# Patient Record
Sex: FEMALE | Race: BLACK OR AFRICAN AMERICAN | NOT HISPANIC OR LATINO | Employment: FULL TIME | URBAN - NONMETROPOLITAN AREA
[De-identification: names, ages, dates, MRNs, and addresses within clinical notes are randomized per-mention and may not be internally consistent; named-entity substitution may affect disease eponyms.]

---

## 2017-04-11 ENCOUNTER — APPOINTMENT (OUTPATIENT)
Dept: LAB | Facility: HOSPITAL | Age: 32
End: 2017-04-11

## 2017-04-11 ENCOUNTER — INITIAL PRENATAL (OUTPATIENT)
Dept: OBSTETRICS AND GYNECOLOGY | Facility: CLINIC | Age: 32
End: 2017-04-11

## 2017-04-11 VITALS
HEIGHT: 68 IN | WEIGHT: 246 LBS | DIASTOLIC BLOOD PRESSURE: 68 MMHG | SYSTOLIC BLOOD PRESSURE: 104 MMHG | BODY MASS INDEX: 37.28 KG/M2

## 2017-04-11 DIAGNOSIS — Z3A.00 WEEKS OF GESTATION OF PREGNANCY NOT SPECIFIED: ICD-10-CM

## 2017-04-11 DIAGNOSIS — Z32.00 PREGNANCY EXAMINATION OR TEST, PREGNANCY UNCONFIRMED: ICD-10-CM

## 2017-04-11 DIAGNOSIS — G40.909 SEIZURE DISORDER IN PREGNANCY, FIRST TRIMESTER (HCC): ICD-10-CM

## 2017-04-11 DIAGNOSIS — Z32.01 PREGNANCY EXAMINATION OR TEST, POSITIVE RESULT: Primary | ICD-10-CM

## 2017-04-11 DIAGNOSIS — O99.351 SEIZURE DISORDER IN PREGNANCY, FIRST TRIMESTER (HCC): ICD-10-CM

## 2017-04-11 PROBLEM — O99.350 SEIZURE DISORDER IN PREGNANCY (HCC): Status: ACTIVE | Noted: 2017-04-11

## 2017-04-11 LAB
ABO GROUP BLD: NORMAL
AMPHET+METHAMPHET UR QL: NEGATIVE
BARBITURATES UR QL SCN: NEGATIVE
BASOPHILS # BLD AUTO: 0.01 10*3/MM3 (ref 0–0.2)
BASOPHILS NFR BLD AUTO: 0.1 % (ref 0–2)
BENZODIAZ UR QL SCN: NEGATIVE
BILIRUB UR QL STRIP: NEGATIVE
BLD GP AB SCN SERPL QL: NEGATIVE
CANNABINOIDS SERPL QL: NEGATIVE
CLARITY UR: CLEAR
COCAINE UR QL: NEGATIVE
COLOR UR: NORMAL
DEPRECATED RDW RBC AUTO: 42.6 FL (ref 36.4–46.3)
EOSINOPHIL # BLD AUTO: 0.04 10*3/MM3 (ref 0–0.7)
EOSINOPHIL NFR BLD AUTO: 0.5 % (ref 0–7)
ERYTHROCYTE [DISTWIDTH] IN BLOOD BY AUTOMATED COUNT: 13.8 % (ref 11.5–14.5)
GLUCOSE UR STRIP-MCNC: NEGATIVE MG/DL
HBV SURFACE AG SERPL QL IA: NEGATIVE
HCT VFR BLD AUTO: 36.9 % (ref 35–45)
HCV AB SER DONR QL: NEGATIVE
HGB BLD-MCNC: 12 G/DL (ref 12–15.5)
HGB UR QL STRIP.AUTO: NEGATIVE
HIV1+2 AB SER QL: NEGATIVE
IMM GRANULOCYTES # BLD: 0.03 10*3/MM3 (ref 0–0.02)
IMM GRANULOCYTES NFR BLD: 0.4 % (ref 0–0.5)
KETONES UR QL STRIP: NEGATIVE
LEUKOCYTE ESTERASE UR QL STRIP.AUTO: NEGATIVE
LYMPHOCYTES # BLD AUTO: 2.04 10*3/MM3 (ref 0.6–4.2)
LYMPHOCYTES NFR BLD AUTO: 23.9 % (ref 10–50)
Lab: NORMAL
MCH RBC QN AUTO: 27.5 PG (ref 26.5–34)
MCHC RBC AUTO-ENTMCNC: 32.5 G/DL (ref 31.4–36)
MCV RBC AUTO: 84.4 FL (ref 80–98)
METHADONE UR QL SCN: NEGATIVE
MONOCYTES # BLD AUTO: 1.02 10*3/MM3 (ref 0–0.9)
MONOCYTES NFR BLD AUTO: 12 % (ref 0–12)
NEUTROPHILS # BLD AUTO: 5.39 10*3/MM3 (ref 2–8.6)
NEUTROPHILS NFR BLD AUTO: 63.1 % (ref 37–80)
NITRITE UR QL STRIP: NEGATIVE
OPIATES UR QL: NEGATIVE
OXYCODONE UR QL SCN: NEGATIVE
PH UR STRIP.AUTO: 6.5 [PH] (ref 5–9)
PLATELET # BLD AUTO: 250 10*3/MM3 (ref 150–450)
PMV BLD AUTO: 9.6 FL (ref 8–12)
PROT UR QL STRIP: NEGATIVE
RBC # BLD AUTO: 4.37 10*6/MM3 (ref 3.77–5.16)
RH BLD: POSITIVE
RUBV IGG SER QL: ABNORMAL
RUBV IGG SER-ACNC: 36.7 IU/ML (ref 0–9.9)
SP GR UR STRIP: 1.01 (ref 1–1.03)
UROBILINOGEN UR QL STRIP: NORMAL
WBC NRBC COR # BLD: 8.53 10*3/MM3 (ref 3.2–9.8)

## 2017-04-11 PROCEDURE — 80307 DRUG TEST PRSMV CHEM ANLYZR: CPT | Performed by: ADVANCED PRACTICE MIDWIFE

## 2017-04-11 PROCEDURE — 86901 BLOOD TYPING SEROLOGIC RH(D): CPT | Performed by: ADVANCED PRACTICE MIDWIFE

## 2017-04-11 PROCEDURE — 36415 COLL VENOUS BLD VENIPUNCTURE: CPT | Performed by: ADVANCED PRACTICE MIDWIFE

## 2017-04-11 PROCEDURE — 86850 RBC ANTIBODY SCREEN: CPT | Performed by: ADVANCED PRACTICE MIDWIFE

## 2017-04-11 PROCEDURE — 85025 COMPLETE CBC W/AUTO DIFF WBC: CPT | Performed by: ADVANCED PRACTICE MIDWIFE

## 2017-04-11 PROCEDURE — 99203 OFFICE O/P NEW LOW 30 MIN: CPT | Performed by: ADVANCED PRACTICE MIDWIFE

## 2017-04-11 PROCEDURE — 87591 N.GONORRHOEAE DNA AMP PROB: CPT | Performed by: ADVANCED PRACTICE MIDWIFE

## 2017-04-11 PROCEDURE — G0432 EIA HIV-1/HIV-2 SCREEN: HCPCS | Performed by: ADVANCED PRACTICE MIDWIFE

## 2017-04-11 PROCEDURE — 87086 URINE CULTURE/COLONY COUNT: CPT | Performed by: ADVANCED PRACTICE MIDWIFE

## 2017-04-11 PROCEDURE — 81003 URINALYSIS AUTO W/O SCOPE: CPT | Performed by: ADVANCED PRACTICE MIDWIFE

## 2017-04-11 PROCEDURE — 86803 HEPATITIS C AB TEST: CPT | Performed by: ADVANCED PRACTICE MIDWIFE

## 2017-04-11 PROCEDURE — 86900 BLOOD TYPING SEROLOGIC ABO: CPT | Performed by: ADVANCED PRACTICE MIDWIFE

## 2017-04-11 PROCEDURE — 87340 HEPATITIS B SURFACE AG IA: CPT | Performed by: ADVANCED PRACTICE MIDWIFE

## 2017-04-11 PROCEDURE — 87491 CHLMYD TRACH DNA AMP PROBE: CPT | Performed by: ADVANCED PRACTICE MIDWIFE

## 2017-04-11 RX ORDER — PRENATAL VIT NO.126/IRON/FOLIC 28MG-0.8MG
TABLET ORAL DAILY
COMMUNITY
End: 2017-05-23

## 2017-04-11 RX ORDER — FOLIC ACID 1 MG/1
TABLET ORAL
Qty: 90 TABLET | Refills: 12 | Status: SHIPPED | OUTPATIENT
Start: 2017-04-11 | End: 2017-05-23

## 2017-04-11 RX ORDER — LEVETIRACETAM 750 MG/1
750 TABLET ORAL 2 TIMES DAILY
Refills: 5 | COMMUNITY
Start: 2017-03-31 | End: 2017-05-23

## 2017-04-11 NOTE — PROGRESS NOTES
S: Patient presents today for her initial OB visit. No significant family or genetic history noted. Patient does have a history of epilepsy, migraines, and back injury. She sees a neurologist. Her last appointment was in March 2017. States she was placed on keppra.  ROS: Reports back pain and cramping. Denies nausea, vomiting, and dysuria.    O: + gestational sac, unable to adequately visualize fetal pole or cardiac activity.  Review prenatal physical tabs.      A:  IUP early gestation, Epilepsy    P: New OB labs, start folic acid 4mg daily given hx of epilepsy, obtain dating US in 2 weeks.  Educated patient on warning signs/symptoms of miscarriage and ectopic pregnancy.    30 minutes out of 40 minutes face-to-face spent counseling patient extensively on dietary changes, exercise, weigh loss, compliance with medications and follow up.    CAMERON Gill    I have reviewed the notes, assessments, and/or procedures performed by Laverne, I concur with her/his documentation of Tamanna Luna.        This document has been electronically signed by Bethanie Garcia CNM on April 11, 2017

## 2017-04-12 LAB
BACTERIA SPEC AEROBE CULT: NORMAL
C TRACH RRNA CVX QL NAA+PROBE: NOT DETECTED
N GONORRHOEA RRNA SPEC QL NAA+PROBE: NOT DETECTED

## 2017-04-14 LAB — RPR SER QL: NORMAL

## 2017-04-26 ENCOUNTER — ROUTINE PRENATAL (OUTPATIENT)
Dept: OBSTETRICS AND GYNECOLOGY | Facility: CLINIC | Age: 32
End: 2017-04-26

## 2017-04-26 VITALS — BODY MASS INDEX: 38.47 KG/M2 | DIASTOLIC BLOOD PRESSURE: 67 MMHG | SYSTOLIC BLOOD PRESSURE: 105 MMHG | WEIGHT: 253 LBS

## 2017-04-26 DIAGNOSIS — G40.909 SEIZURE DISORDER IN PREGNANCY, FIRST TRIMESTER (HCC): Primary | ICD-10-CM

## 2017-04-26 DIAGNOSIS — O99.351 SEIZURE DISORDER IN PREGNANCY, FIRST TRIMESTER (HCC): Primary | ICD-10-CM

## 2017-04-26 PROCEDURE — 99212 OFFICE O/P EST SF 10 MIN: CPT | Performed by: ADVANCED PRACTICE MIDWIFE

## 2017-04-26 NOTE — PROGRESS NOTES
CC: ETHAN visit, history reviewed see history tabs.     ROS:Positive occasional nausea but manages without medication, cramping   Negative leaking fluid from the vagina, swelling in her legs, headache and visual changes      Educated on:drinking H2O, emptying bladder frequently, tylenol OTC, reviewed labs and US findings    Plan: f/u in 4 week/s    Requested records for PAP and patient is to call withy neurologist information in order to request records. Stated that she has a neurologist in [place] and she also goes to an epilepsy center. They are aware of pregnancy

## 2017-05-23 ENCOUNTER — ROUTINE PRENATAL (OUTPATIENT)
Dept: OBSTETRICS AND GYNECOLOGY | Facility: CLINIC | Age: 32
End: 2017-05-23

## 2017-05-23 VITALS — BODY MASS INDEX: 38.32 KG/M2 | DIASTOLIC BLOOD PRESSURE: 74 MMHG | WEIGHT: 252 LBS | SYSTOLIC BLOOD PRESSURE: 101 MMHG

## 2017-05-23 DIAGNOSIS — G40.909 SEIZURE DISORDER IN PREGNANCY, FIRST TRIMESTER (HCC): Primary | ICD-10-CM

## 2017-05-23 DIAGNOSIS — Z3A.12 12 WEEKS GESTATION OF PREGNANCY: ICD-10-CM

## 2017-05-23 DIAGNOSIS — O99.351 SEIZURE DISORDER IN PREGNANCY, FIRST TRIMESTER (HCC): Primary | ICD-10-CM

## 2017-05-23 PROCEDURE — 99212 OFFICE O/P EST SF 10 MIN: CPT | Performed by: ADVANCED PRACTICE MIDWIFE

## 2017-05-23 PROCEDURE — 88142 CYTOPATH C/V THIN LAYER: CPT | Performed by: ADVANCED PRACTICE MIDWIFE

## 2017-05-23 PROCEDURE — 88141 CYTOPATH C/V INTERPRET: CPT | Performed by: PATHOLOGY

## 2017-05-26 LAB
LAB AP CASE REPORT: NORMAL
LAB AP GYN ADDITIONAL INFORMATION: NORMAL
LAB AP GYN OTHER FINDINGS: NORMAL
Lab: NORMAL
PATH INTERP SPEC-IMP: NORMAL
STAT OF ADQ CVX/VAG CYTO-IMP: NORMAL

## 2017-06-21 ENCOUNTER — APPOINTMENT (OUTPATIENT)
Dept: LAB | Facility: HOSPITAL | Age: 32
End: 2017-06-21

## 2017-06-21 ENCOUNTER — ROUTINE PRENATAL (OUTPATIENT)
Dept: OBSTETRICS AND GYNECOLOGY | Facility: CLINIC | Age: 32
End: 2017-06-21

## 2017-06-21 VITALS — WEIGHT: 251 LBS | DIASTOLIC BLOOD PRESSURE: 58 MMHG | BODY MASS INDEX: 38.16 KG/M2 | SYSTOLIC BLOOD PRESSURE: 106 MMHG

## 2017-06-21 DIAGNOSIS — G40.909 SEIZURE DISORDER IN PREGNANCY, SECOND TRIMESTER (HCC): Primary | ICD-10-CM

## 2017-06-21 DIAGNOSIS — O99.352 SEIZURE DISORDER IN PREGNANCY, SECOND TRIMESTER (HCC): Primary | ICD-10-CM

## 2017-06-21 DIAGNOSIS — Z36.89 ENCOUNTER FOR FETAL ANATOMIC SURVEY: ICD-10-CM

## 2017-06-21 DIAGNOSIS — Z3A.17 17 WEEKS GESTATION OF PREGNANCY: ICD-10-CM

## 2017-06-21 PROCEDURE — 82105 ALPHA-FETOPROTEIN SERUM: CPT | Performed by: ADVANCED PRACTICE MIDWIFE

## 2017-06-21 PROCEDURE — 86336 INHIBIN A: CPT | Performed by: ADVANCED PRACTICE MIDWIFE

## 2017-06-21 PROCEDURE — 82677 ASSAY OF ESTRIOL: CPT | Performed by: ADVANCED PRACTICE MIDWIFE

## 2017-06-21 PROCEDURE — 84702 CHORIONIC GONADOTROPIN TEST: CPT | Performed by: ADVANCED PRACTICE MIDWIFE

## 2017-06-21 PROCEDURE — 36415 COLL VENOUS BLD VENIPUNCTURE: CPT | Performed by: ADVANCED PRACTICE MIDWIFE

## 2017-06-21 PROCEDURE — 99212 OFFICE O/P EST SF 10 MIN: CPT | Performed by: ADVANCED PRACTICE MIDWIFE

## 2017-06-21 PROCEDURE — 81220 CFTR GENE COM VARIANTS: CPT | Performed by: ADVANCED PRACTICE MIDWIFE

## 2017-06-21 NOTE — PROGRESS NOTES
"CC: ETHAN visit, history reviewed see history tabs.     ROS:Positive had a seizure last Monday- Saw neurologist and they increased Keppra dose      Negative leaking fluid from the vagina, swelling in her legs, headache, visual changes, low back pain and heartburn    Educated on:Patient counseled on quad screen test is an optional test which helps predict the risk of certain types of birth defects such as (down syndrome and other trisomies), abdominal wall defects and open neural tube defects (spina bifida, anencephaly). Studies show that the Quad screen detects about 60 % of Down syndrome cases and detects more than 80% of tube defects. However is only a screening test. It only predicts risks for problems, but it does not diagnose a definite problem. Abnormal Quad Screen result may be \"false positive\" and further testing may show that there's no evidence of defects in your baby.     Cystic Fibrosis is a genetic disease common in  population. The disease affects the lungs and pancreas, usually resulting in frequent hospitalization and usually premature death. Average survival is 30 years. Both parents must be carriers before a child can have the disease. If both parents are identified as carriers the infant has 25% chance of having CF. To determine if the unborn child has CF more invasive testing must be perform (amniocentesis or chorionic villi sampling). Ordered Quad and Cf screening    A/Plan: f/u in 2 week/s   Hx seizures- saw neurologist and they increased Keppra dose; US- detailed anatomy with MFM consult   Obese  Offered her MD care due to hx seizures but she is ok with seeing midwives.         "

## 2017-06-24 LAB
2ND TRIMESTER 4 SCREEN SERPL-IMP: NORMAL
AFP ADJ MOM SERPL: 0.65
AFP INTERP SERPL-IMP: NORMAL
AFP SERPL-MCNC: 19.8 NG/ML
AGE AT DELIVERY: 30.5 YEARS
FET TS 18 RISK FROM MAT AGE: NORMAL
FET TS 21 RISK FROM MAT AGE: 656
GA METHOD: NORMAL
GA: 17 WEEKS
HCG ADJ MOM SERPL: 1.22
HCG SERPL-ACNC: NORMAL MIU/ML
IDDM PATIENT QL: NO
INHIBIN A ADJ MOM SERPL: 1.01
INHIBIN A SERPL-MCNC: 131.99 PG/ML
LABORATORY COMMENT REPORT: NORMAL
MULTIPLE PREGNANCY: NO
NEURAL TUBE DEFECT RISK FETUS: NORMAL %
RESULT: NORMAL
TS 18 RISK FETUS: NORMAL
TS 21 RISK FETUS: 1334
U ESTRIOL ADJ MOM SERPL: 0.93
U ESTRIOL SERPL-MCNC: 0.87 NG/ML

## 2017-06-27 LAB
CFTR MUT ANL BLD/T: NORMAL
CONV COMMENT: NORMAL

## 2017-07-11 ENCOUNTER — CONSULT (OUTPATIENT)
Dept: OBSTETRICS AND GYNECOLOGY | Facility: CLINIC | Age: 32
End: 2017-07-11

## 2017-07-11 ENCOUNTER — ROUTINE PRENATAL (OUTPATIENT)
Dept: OBSTETRICS AND GYNECOLOGY | Facility: CLINIC | Age: 32
End: 2017-07-11

## 2017-07-11 VITALS — DIASTOLIC BLOOD PRESSURE: 68 MMHG | BODY MASS INDEX: 38.77 KG/M2 | WEIGHT: 255 LBS | SYSTOLIC BLOOD PRESSURE: 114 MMHG

## 2017-07-11 DIAGNOSIS — O99.352 SEIZURE DISORDER IN PREGNANCY, SECOND TRIMESTER (HCC): ICD-10-CM

## 2017-07-11 DIAGNOSIS — R56.9 SEIZURES (HCC): Primary | ICD-10-CM

## 2017-07-11 DIAGNOSIS — G40.909 SEIZURE DISORDER IN PREGNANCY, SECOND TRIMESTER (HCC): Primary | ICD-10-CM

## 2017-07-11 DIAGNOSIS — Q69.9 POLYDACTYLY: ICD-10-CM

## 2017-07-11 DIAGNOSIS — O99.352 SEIZURE DISORDER IN PREGNANCY, SECOND TRIMESTER (HCC): Primary | ICD-10-CM

## 2017-07-11 DIAGNOSIS — G40.909 SEIZURE DISORDER IN PREGNANCY, SECOND TRIMESTER (HCC): ICD-10-CM

## 2017-07-11 DIAGNOSIS — Z3A.19 19 WEEKS GESTATION OF PREGNANCY: Primary | ICD-10-CM

## 2017-07-11 DIAGNOSIS — Z36.9 PRENATAL SCREENING ENCOUNTER: ICD-10-CM

## 2017-07-11 PROCEDURE — 99213 OFFICE O/P EST LOW 20 MIN: CPT | Performed by: NURSE PRACTITIONER

## 2017-07-11 NOTE — PROGRESS NOTES
Thank you for allowing our service in consultation via telemedicine to Ms. Luna given her diagnosis of epilepsy in pregnancy.  As you will recall, she is a 32-year-old black female G1, P0 with a 14-year history of seizures.  She has been treated in the past with Topamax, Dilantin and Depakote without success.  She is currently treated with Keppra 1000 mg b.i.d. and receiving neurologic consultation through Dr. Justin Ramos and ARASELI Renteria.   The optimal treatment of women with epilepsy who are of childbearing age is unclear because of a lack of conclusive data on the comparative teratogenicity of different antiseizure drugs.  Data on the comparative efficacy of various antiseizure drugs for controlling seizures during pregnancy are also quite limited and there are no randomized trials in this setting.  Antiseizure drug therapy should be optimized prior to conception, if possible, before exposure of the fetus to potential teratogenic effects of antiseizure drugs.  Since there is no agreement as to which antiseizure drug is most or least teratogenic, the antiseizure drug that stops seizures in a given patient is the one that should be used.  An exception is valproate.  Results from pregnancy registries and cohort studies suggest a trend toward higher teratogenicity with valproate than with other antiseizure drugs. While concentrations of most antiseizure drugs are generally lower in umbilical compared with maternal serum, valproate is associated with higher umbilical than maternal concentrations, perhaps because of increased placental transfer.  In utero, valproate exposure has also been linked to poorer neurodevelopmental outcomes in childhood, including increased risk for autism spectrum disorders.      Recommendations concerning the use of antiseizure drugs include:    • The antiseizure drug should be administered at the lowest dose and lowest plasma level that protects against tonic-clonic and/or  complex partial seizures.      • The use of multiple agents should be avoided, if possible, especially combinations involving valproate, carbamazepine and phenobarbital.      Low serum folate levels in women with epilepsy are independently associated with an increased risk of major fetal malformations.  It has not yet been conclusively determined if folic acid supplementation prevents neural tube defects in women receiving antiseizure drugs; however, animal studies have shown that valproate and phenytoin decrease the concentration of certain forms of folate and are associated with neural tube defects.  Management during pregnancy consists of folic acid supplementation, screening for major malformations and monitoring plasma antiseizure drug levels.      The patient is currently utilizing Keppra 1000 mg p.o. b.i.d.  Levetiracetam (Keppra) has been assigned to pregnancy category C by the FDA. Animal studies have revealed evidence of increased incidences of minor fetal skeletal abnormalities, retarded offspring growth, embryofetal mortality, and offspring behavioral alterations. Extensive transfer of levetiracetam from mother to fetus has been reported. Physiological changes may gradually decrease the plasma levels of levetiracetam throughout pregnancy, therefore, adjustment in dose may be necessary to avoid seizures. There are no controlled data in human pregnancy. Pregnant patients taking levetiracetam are encouraged to enroll in the North American Antiepileptic Drug (NAAED) Pregnancy Registry. This can be done by calling the toll-free number 1-878.418.5784, but must be done by patients themselves.    I spent a total time of  15 minutes with this patient of which greater than 50% was face to face counseling and coordination of care.  Questions asked by the patient were answered.      Ultrasound Impression:  Size consistent with the previously assigned gestational age.  No lemon or banana sign.  Nuchal fold = 3.9 mm  (normal).  Nasal bone is present.  Normal 4-chamber fetal cardiac view with normal right and left ventricular outflow tracts.  No obvious polydactyly for the hands or feet.  Cervical length = 3.4 cm.    Recommendation:  See consult note.  I noted that as plasma levels of Keppra may decrease throughout pregnancy, adjustment in dose may be necessary to avoid seizures.  Agree with increased folic acid supplementation.  Would suggest repeat ultrasound for growth and assessment for polydactyly given her personal history at 32 weeks' gestation.

## 2017-07-12 NOTE — PROGRESS NOTES
Hx reviewed. Reports that she was admitted to the hospital in [place] on 6/30 because her keppra levels were low and she had another seizure. Her neurologist is in [place]; she was admitted for 5 days and states that her Keppra levels were therapeutic on 7/5 when she was discharged. She is currently taking Keppra 2000mg BID and has an appt with Neuro on 7/30 for follow up.    ROS: Denies n/v, dysuria, spotting, vaginal d/c or odor, cramping or LOF. Reports that she gets frequent headaches, but no more usual than prior to pregnancy. Reports that she will get migraines that cause seizures if she doesn't take tylenol soon enough; occasionally it will get bad enough to go to the ER.     We discussed using Imitrex or another migraine abortive medication in attempt to prevent them from triggering a seizure. She states that she is nervous about taking any other medications on top of the keppra because of the risk of birth defects. Negative screening for OSB, Trisomy 21 and 18. Reviewed results of anatomy scan today. EFW- 341g or 0lb 12oz. BONITA- 13.6cm. FHR- 158. 3 vessel cord with normal placental insertion. Anterior placenta, no previa. All fetal structures noted to appear normal. Recommended follow up at 32 weeks for growth 2/2 seizure disorder and medications.    Encouraged to treat headaches as soon as noticed and continue to keep in close contact with neurologist.

## 2017-08-08 ENCOUNTER — APPOINTMENT (OUTPATIENT)
Dept: LAB | Facility: HOSPITAL | Age: 32
End: 2017-08-08

## 2017-08-08 ENCOUNTER — ROUTINE PRENATAL (OUTPATIENT)
Dept: OBSTETRICS AND GYNECOLOGY | Facility: CLINIC | Age: 32
End: 2017-08-08

## 2017-08-08 VITALS — DIASTOLIC BLOOD PRESSURE: 72 MMHG | SYSTOLIC BLOOD PRESSURE: 112 MMHG | WEIGHT: 249 LBS | BODY MASS INDEX: 37.86 KG/M2

## 2017-08-08 DIAGNOSIS — Z3A.23 23 WEEKS GESTATION OF PREGNANCY: ICD-10-CM

## 2017-08-08 DIAGNOSIS — O99.352 SEIZURE DISORDER IN PREGNANCY, SECOND TRIMESTER (HCC): Primary | ICD-10-CM

## 2017-08-08 DIAGNOSIS — G40.909 SEIZURE DISORDER IN PREGNANCY, SECOND TRIMESTER (HCC): Primary | ICD-10-CM

## 2017-08-08 DIAGNOSIS — N89.8 VAGINAL ITCHING: ICD-10-CM

## 2017-08-08 LAB
CANDIDA ALBICANS: NEGATIVE
DEPRECATED RDW RBC AUTO: 43.6 FL (ref 36.4–46.3)
ERYTHROCYTE [DISTWIDTH] IN BLOOD BY AUTOMATED COUNT: 13.9 % (ref 11.5–14.5)
GARDNERELLA VAGINALIS: NEGATIVE
GLUCOSE 1H P 100 G GLC PO SERPL-MCNC: 95 MG/DL (ref 60–140)
HCT VFR BLD AUTO: 32.1 % (ref 35–45)
HGB BLD-MCNC: 10.5 G/DL (ref 12–15.5)
MCH RBC QN AUTO: 28.1 PG (ref 26.5–34)
MCHC RBC AUTO-ENTMCNC: 32.7 G/DL (ref 31.4–36)
MCV RBC AUTO: 85.8 FL (ref 80–98)
PLATELET # BLD AUTO: 229 10*3/MM3 (ref 150–450)
PMV BLD AUTO: 10.3 FL (ref 8–12)
RBC # BLD AUTO: 3.74 10*6/MM3 (ref 3.77–5.16)
TRICHOMONAS VAGINALIS PCR: NEGATIVE
WBC NRBC COR # BLD: 10.34 10*3/MM3 (ref 3.2–9.8)

## 2017-08-08 PROCEDURE — 36415 COLL VENOUS BLD VENIPUNCTURE: CPT | Performed by: ADVANCED PRACTICE MIDWIFE

## 2017-08-08 PROCEDURE — 87660 TRICHOMONAS VAGIN DIR PROBE: CPT | Performed by: ADVANCED PRACTICE MIDWIFE

## 2017-08-08 PROCEDURE — 85027 COMPLETE CBC AUTOMATED: CPT | Performed by: ADVANCED PRACTICE MIDWIFE

## 2017-08-08 PROCEDURE — 99212 OFFICE O/P EST SF 10 MIN: CPT | Performed by: ADVANCED PRACTICE MIDWIFE

## 2017-08-08 PROCEDURE — 87510 GARDNER VAG DNA DIR PROBE: CPT | Performed by: ADVANCED PRACTICE MIDWIFE

## 2017-08-08 PROCEDURE — 87480 CANDIDA DNA DIR PROBE: CPT | Performed by: ADVANCED PRACTICE MIDWIFE

## 2017-08-08 PROCEDURE — 82950 GLUCOSE TEST: CPT | Performed by: ADVANCED PRACTICE MIDWIFE

## 2017-08-08 NOTE — PROGRESS NOTES
CC: ETHAN visit, history reviewed see history tabs. Was hospitalized in 6/30 through 7/5 for a seizure. Reviewed notes and patient was hospitalized for 5 days- no seizure while in the hospital. Has f/u appt 8/30. Encouraged patient to keep appt. With neurologist and to call and confirm the day. Patient verbalized understanding. Neurologist increased her Keppra dose.    ROS:Positive low back pain and suprapubic pain   Negative leaking fluid from the vagina, swelling in her legs, headache, visual changes and heartburn, no dysuria       Educated on: declined PT, rec. Maternity belt    A/Plan: f/u in 4 week/s   Tamanna was seen today for routine prenatal visit.    Diagnoses and all orders for this visit:    Seizure disorder in pregnancy, second trimester- Was hospitalized in 6/30 through 7/5 for a seizure. Reviewed notes and patient was hospitalized for 5 days- no seizure while in the hospital. Has f/u appt 8/30. Encouraged patient to keep appt. With neurologist and to call and confirm the day. Patient verbalized understanding. Neurologist increased her Keppra dose.    23 weeks gestation of pregnancy-     Glucose, Post 50 Gm Glucola  -     CBC (No Diff)    Vaginal itching  -     Gardnerella vaginalis, Trichomonas vaginalis, Candida albicans, PCR

## 2017-08-31 DIAGNOSIS — O99.210 OBESITY AFFECTING PREGNANCY: Primary | ICD-10-CM

## 2017-09-05 ENCOUNTER — ROUTINE PRENATAL (OUTPATIENT)
Dept: OBSTETRICS AND GYNECOLOGY | Facility: CLINIC | Age: 32
End: 2017-09-05

## 2017-09-05 VITALS — DIASTOLIC BLOOD PRESSURE: 72 MMHG | WEIGHT: 271 LBS | BODY MASS INDEX: 41.21 KG/M2 | SYSTOLIC BLOOD PRESSURE: 104 MMHG

## 2017-09-05 DIAGNOSIS — G40.909 SEIZURE DISORDER IN PREGNANCY, SECOND TRIMESTER (HCC): Primary | ICD-10-CM

## 2017-09-05 DIAGNOSIS — O99.352 SEIZURE DISORDER IN PREGNANCY, SECOND TRIMESTER (HCC): Primary | ICD-10-CM

## 2017-09-05 DIAGNOSIS — Z3A.27 27 WEEKS GESTATION OF PREGNANCY: ICD-10-CM

## 2017-09-05 PROCEDURE — 99212 OFFICE O/P EST SF 10 MIN: CPT | Performed by: ADVANCED PRACTICE MIDWIFE

## 2017-09-05 RX ORDER — VITAMIN A ACETATE, BETA CAROTENE, ASCORBIC ACID, CHOLECALCIFEROL, .ALPHA.-TOCOPHEROL ACETATE, DL-, THIAMINE MONONITRATE, RIBOFLAVIN, NIACINAMIDE, PYRIDOXINE HYDROCHLORIDE, FOLIC ACID, CYANOCOBALAMIN, CALCIUM CARBONATE, FERROUS FUMARATE, ZINC OXIDE, CUPRIC OXIDE 3080; 12; 120; 400; 1; 1.84; 3; 20; 22; 920; 25; 200; 27; 10; 2 [IU]/1; UG/1; MG/1; [IU]/1; MG/1; MG/1; MG/1; MG/1; MG/1; [IU]/1; MG/1; MG/1; MG/1; MG/1; MG/1
1 TABLET, FILM COATED ORAL DAILY
Qty: 30 EACH | Refills: 12 | Status: SHIPPED | OUTPATIENT
Start: 2017-09-05

## 2017-09-05 NOTE — PROGRESS NOTES
CC: ETHAN visit, history reviewed see history tabs. Reports that she had a seizure about a week ago but did not go to the ER. Has appt in December with neurologist    ROS:Positive headache   Negative leaking fluid from the vagina, swelling in her legs, headache, visual changes and low back pain      Educated on:reviewed 1 hour glucose test. Patient gained 20 lbs so far in pregnancy, educated on nutrition. Educated on increasing water and reviewed comfort measures. Verbalized understanding    A/Plan: f/u in 2 week/s   Tamanna was seen today for routine prenatal visit.    Diagnoses and all orders for this visit:    Seizure disorder in pregnancy, second trimester  -     US Ob Follow Up Transabdominal Approach; Future    27 weeks gestation of pregnancy    Other orders  -     prenatal vitamins (PRENATAL 27-1) 27-1 MG tablet tablet; Take 1 tablet by mouth Daily.

## 2017-09-14 ENCOUNTER — HOSPITAL ENCOUNTER (OUTPATIENT)
Facility: HOSPITAL | Age: 32
Discharge: HOME | End: 2017-09-14
Attending: OBSTETRICS & GYNECOLOGY | Admitting: OBSTETRICS & GYNECOLOGY

## 2017-09-14 VITALS
OXYGEN SATURATION: 99 % | TEMPERATURE: 97 F | HEART RATE: 92 BPM | SYSTOLIC BLOOD PRESSURE: 124 MMHG | DIASTOLIC BLOOD PRESSURE: 70 MMHG

## 2017-09-14 LAB
BILIRUB UR QL STRIP: NEGATIVE
CANDIDA ALBICANS: POSITIVE
CLARITY UR: CLEAR
COLOR UR: YELLOW
GARDNERELLA VAGINALIS: NEGATIVE
GLUCOSE UR STRIP-MCNC: NEGATIVE MG/DL
HGB UR QL STRIP.AUTO: NEGATIVE
KETONES UR QL STRIP: ABNORMAL
LEUKOCYTE ESTERASE UR QL STRIP.AUTO: NEGATIVE
NITRITE UR QL STRIP: NEGATIVE
PH UR STRIP.AUTO: 7 [PH] (ref 5–9)
PROT UR QL STRIP: NEGATIVE
SP GR UR STRIP: 1.02 (ref 1–1.03)
TRICHOMONAS VAGINALIS PCR: NEGATIVE
UROBILINOGEN UR QL STRIP: ABNORMAL

## 2017-09-14 PROCEDURE — 87480 CANDIDA DNA DIR PROBE: CPT | Performed by: OBSTETRICS & GYNECOLOGY

## 2017-09-14 PROCEDURE — 87660 TRICHOMONAS VAGIN DIR PROBE: CPT | Performed by: OBSTETRICS & GYNECOLOGY

## 2017-09-14 PROCEDURE — 59025 FETAL NON-STRESS TEST: CPT

## 2017-09-14 PROCEDURE — 81003 URINALYSIS AUTO W/O SCOPE: CPT | Performed by: OBSTETRICS & GYNECOLOGY

## 2017-09-14 PROCEDURE — 87510 GARDNER VAG DNA DIR PROBE: CPT | Performed by: OBSTETRICS & GYNECOLOGY

## 2017-09-14 PROCEDURE — G0463 HOSPITAL OUTPT CLINIC VISIT: HCPCS

## 2017-09-15 NOTE — NON STRESS TEST
Tamanna Luna, a  at 29w1d with an TENA of 2017, by Last Menstrual Period, was seen at [facility] for a nonstress test.    Chief Complaint   Patient presents with   • Decreased Fetal Movement   • Abdominal Pain       Interpretation A  Nonstress Test Interpretation A: Reactive (17 : Lu Moncada RN)  Comments A: verified with ANGIE Pulido RN (17 : Lu Moncada RN)

## 2017-09-15 NOTE — PROGRESS NOTES
Patient presents with lower abdominal discomfort but only when she is on her feet and active.  She is better now that she is resting.  She has irritation between her legs when she gets hot and sweaty.  She c/o itching in this area.  FHR:  Category I  [place]:  No contractions.  Pelvic:  Erythema of the inner thighs bilaterally consistent with Candida  Vaginitis panel: candida    Impression:  1)Candida of the skin and vagina.  Prescription for Diflucan and Nystatin                       2)Round ligament pain.  Precautions discussed.  Comfort measures discussed.

## 2017-09-21 ENCOUNTER — ROUTINE PRENATAL (OUTPATIENT)
Dept: OBSTETRICS AND GYNECOLOGY | Facility: CLINIC | Age: 32
End: 2017-09-21

## 2017-09-21 VITALS — WEIGHT: 279 LBS | DIASTOLIC BLOOD PRESSURE: 69 MMHG | BODY MASS INDEX: 42.42 KG/M2 | SYSTOLIC BLOOD PRESSURE: 100 MMHG

## 2017-09-21 DIAGNOSIS — O99.353 SEIZURE DISORDER IN PREGNANCY, THIRD TRIMESTER (HCC): Primary | ICD-10-CM

## 2017-09-21 DIAGNOSIS — Q69.9 POLYDACTYLY: ICD-10-CM

## 2017-09-21 DIAGNOSIS — G40.909 SEIZURE DISORDER IN PREGNANCY, THIRD TRIMESTER (HCC): Primary | ICD-10-CM

## 2017-09-21 DIAGNOSIS — Z3A.30 30 WEEKS GESTATION OF PREGNANCY: ICD-10-CM

## 2017-09-21 PROCEDURE — 99212 OFFICE O/P EST SF 10 MIN: CPT | Performed by: ADVANCED PRACTICE MIDWIFE

## 2017-09-21 NOTE — PROGRESS NOTES
CC: ETHAN visit, history reviewed see history tabs.     ROS: Negative leaking fluid from the vagina, swelling in her legs, headache, visual changes, low back pain and heartburn      Educated on:NUTRITION, Patient stated that her neurologist is not going to see her until December. We will attempt to contact the neurologist office and make an appointment sooner. Patient is to update her phone number.     A/Plan: f/u in 2 week/s with US   Tamanna was seen today for routine prenatal visit.    Diagnoses and all orders for this visit:    Seizure disorder in pregnancy, third trimester    30 weeks gestation of pregnancy    Polydactyly

## 2017-10-05 ENCOUNTER — ROUTINE PRENATAL (OUTPATIENT)
Dept: OBSTETRICS AND GYNECOLOGY | Facility: CLINIC | Age: 32
End: 2017-10-05

## 2017-10-05 VITALS — WEIGHT: 282 LBS | BODY MASS INDEX: 42.88 KG/M2 | SYSTOLIC BLOOD PRESSURE: 124 MMHG | DIASTOLIC BLOOD PRESSURE: 84 MMHG

## 2017-10-05 DIAGNOSIS — Z3A.32 32 WEEKS GESTATION OF PREGNANCY: ICD-10-CM

## 2017-10-05 DIAGNOSIS — Q69.9 POLYDACTYLY: ICD-10-CM

## 2017-10-05 DIAGNOSIS — G40.909 SEIZURE DISORDER DURING PREGNANCY IN THIRD TRIMESTER (HCC): Primary | ICD-10-CM

## 2017-10-05 DIAGNOSIS — O99.353 SEIZURE DISORDER DURING PREGNANCY IN THIRD TRIMESTER (HCC): Primary | ICD-10-CM

## 2017-10-05 PROCEDURE — 99212 OFFICE O/P EST SF 10 MIN: CPT | Performed by: NURSE PRACTITIONER

## 2017-10-18 ENCOUNTER — HOSPITAL ENCOUNTER (OUTPATIENT)
Facility: HOSPITAL | Age: 32
Discharge: HOME | End: 2017-10-18
Attending: OBSTETRICS & GYNECOLOGY | Admitting: OBSTETRICS & GYNECOLOGY

## 2017-10-18 PROCEDURE — 59025 FETAL NON-STRESS TEST: CPT

## 2017-10-18 PROCEDURE — G0463 HOSPITAL OUTPT CLINIC VISIT: HCPCS

## 2017-10-18 NOTE — NON STRESS TEST
Tamanna Luna, a  at 34w0d with an TENA of 2017, by Last Menstrual Period, was seen at [facility] for a nonstress test.    Chief Complaint   Patient presents with   • Decreased Fetal Movement     pt states she hasnt felt the baby move since 4pm yesterday. pt reports no vaginal bleeding states she has some mucous discharge.        Interpretation A  Nonstress Test Interpretation A: Reactive (10/18/17 0428 : Damaris Kim RN)  Comments A: verified with zoraida HARTLEY (10/18/17 0428 : Damaris Kim, NAEEM)

## 2017-10-18 NOTE — DISCHARGE INSTRUCTIONS
Please return if decreased fetal movement, any bright red vaginal bleeding or leaking of water like your water broke, painful regular contractions every 2-3 minutes apart. Make sure you keep your next appointment and drink plenty of water.

## 2017-10-23 ENCOUNTER — TELEPHONE (OUTPATIENT)
Dept: NUTRITION | Facility: HOSPITAL | Age: 32
End: 2017-10-23

## 2017-10-23 NOTE — PROGRESS NOTES
Adult Outpatient Nutrition  Assessment    Patient Name:  Tamanna Luna  YOB: 1985  MRN: [id number]    Assessment Date:  10/23/2017    Comments:  Pt was referred to RDN for lack of weight gain in pregnancy. Pt said she had gained on her last visit so MD said she didn't need to talk to RDN after all.  Pt is happy she has gained. Insurance doesn't pay for Medical Nutrition Therapy. If needed, RDN is available for discussion about eating healthy/promoting weight gain in pregnancy.                  [unfilled]        Electronically signed by:  Amara Leonard RD  10/23/17 3:44 PM

## 2017-10-26 ENCOUNTER — ROUTINE PRENATAL (OUTPATIENT)
Dept: OBSTETRICS AND GYNECOLOGY | Facility: CLINIC | Age: 32
End: 2017-10-26

## 2017-10-26 VITALS — DIASTOLIC BLOOD PRESSURE: 65 MMHG | WEIGHT: 291 LBS | SYSTOLIC BLOOD PRESSURE: 105 MMHG | BODY MASS INDEX: 44.25 KG/M2

## 2017-10-26 DIAGNOSIS — Z36.85 ANTENATAL SCREENING FOR STREPTOCOCCUS B: ICD-10-CM

## 2017-10-26 DIAGNOSIS — Z3A.35 35 WEEKS GESTATION OF PREGNANCY: ICD-10-CM

## 2017-10-26 DIAGNOSIS — O99.353 SEIZURE DISORDER DURING PREGNANCY IN THIRD TRIMESTER (HCC): Primary | ICD-10-CM

## 2017-10-26 DIAGNOSIS — Z03.79 SUSPECTED MATERNAL CONDITION NOT FOUND: ICD-10-CM

## 2017-10-26 DIAGNOSIS — G40.909 SEIZURE DISORDER DURING PREGNANCY IN THIRD TRIMESTER (HCC): Primary | ICD-10-CM

## 2017-10-26 PROCEDURE — 87653 STREP B DNA AMP PROBE: CPT | Performed by: ADVANCED PRACTICE MIDWIFE

## 2017-10-26 PROCEDURE — 87186 SC STD MICRODIL/AGAR DIL: CPT | Performed by: ADVANCED PRACTICE MIDWIFE

## 2017-10-26 PROCEDURE — 99212 OFFICE O/P EST SF 10 MIN: CPT | Performed by: ADVANCED PRACTICE MIDWIFE

## 2017-10-26 NOTE — PROGRESS NOTES
CC: ETHAN visit, history reviewed see history tabs.     ROS: Negative leaking fluid from the vagina, swelling in her legs, headache, visual changes, low back pain and heartburn      Educated on:the importance of keeping appointments, nutrition- stated that she eats everything because she craves it.    A/Plan: f/u in 2 week/s   Tamanna was seen today for routine prenatal visit.    Diagnoses and all orders for this visit:    Seizure disorder during pregnancy in third trimester     screening for streptococcus B  -     Group B Strep (Molecular) - Swab, Vagina    Suspected maternal condition not found- patient missed last 2 appointments- referred to Dr. Lombardi due to our next US opening not until 5 weeks.     -     US Ob Follow Up Transabdominal Approach; Future    35 weeks gestation of pregnancy

## 2017-10-27 PROBLEM — B95.1 POSITIVE GBS TEST: Status: ACTIVE | Noted: 2017-10-27

## 2017-10-30 LAB
BACTERIA SPEC AEROBE CULT: ABNORMAL
GROUP B STREP, DNA: POSITIVE

## 2017-11-02 ENCOUNTER — DOCUMENTATION (OUTPATIENT)
Dept: OBSTETRICS AND GYNECOLOGY | Facility: CLINIC | Age: 32
End: 2017-11-02

## 2017-11-02 NOTE — PROGRESS NOTES
Reviewed US from Dr. Lombardi's office EFW 6 lbs 3 oz, growth at 53%. He recommended IOL at 39 weeks gestation due to increased risk of IUFD with BMI >40

## 2017-11-09 ENCOUNTER — ROUTINE PRENATAL (OUTPATIENT)
Dept: OBSTETRICS AND GYNECOLOGY | Facility: CLINIC | Age: 32
End: 2017-11-09

## 2017-11-09 VITALS — DIASTOLIC BLOOD PRESSURE: 66 MMHG | SYSTOLIC BLOOD PRESSURE: 113 MMHG | BODY MASS INDEX: 45.61 KG/M2 | WEIGHT: 293 LBS

## 2017-11-09 DIAGNOSIS — G40.909 SEIZURE DISORDER DURING PREGNANCY IN THIRD TRIMESTER (HCC): Primary | ICD-10-CM

## 2017-11-09 DIAGNOSIS — Q69.9 POLYDACTYLY: ICD-10-CM

## 2017-11-09 DIAGNOSIS — O99.353 SEIZURE DISORDER DURING PREGNANCY IN THIRD TRIMESTER (HCC): Primary | ICD-10-CM

## 2017-11-09 DIAGNOSIS — Z3A.37 37 WEEKS GESTATION OF PREGNANCY: ICD-10-CM

## 2017-11-09 DIAGNOSIS — B95.1 POSITIVE GBS TEST: ICD-10-CM

## 2017-11-09 PROCEDURE — 99212 OFFICE O/P EST SF 10 MIN: CPT | Performed by: ADVANCED PRACTICE MIDWIFE

## 2017-11-09 NOTE — PROGRESS NOTES
CC: ETHAN visit, history reviewed see history tabs. Patient requested records just in case she delivers at [facility]    ROS:Positive round ligament pain   Negative leaking fluid from the vagina, swelling in her legs, headache, visual changes, low back pain and heartburn      Educated on:Dr. Lombardi recommended IOL at 39 weeks, discussed cervidil and pitocin IOL and how the process goes, verbalized understanding, GBS + and tx will be antibiotics, comfort measures for ligament pain reviewed    A/Plan: f/u in 1 week/s   Tamanna was seen today for routine prenatal visit.    Diagnoses and all orders for this visit:    Seizure disorder during pregnancy in third trimester    Positive GBS test    Polydactyly    37 weeks gestation of pregnancy

## 2017-11-16 ENCOUNTER — PREP FOR SURGERY (OUTPATIENT)
Dept: OTHER | Facility: HOSPITAL | Age: 32
End: 2017-11-16

## 2017-11-16 ENCOUNTER — ROUTINE PRENATAL (OUTPATIENT)
Dept: OBSTETRICS AND GYNECOLOGY | Facility: CLINIC | Age: 32
End: 2017-11-16

## 2017-11-16 VITALS — SYSTOLIC BLOOD PRESSURE: 106 MMHG | DIASTOLIC BLOOD PRESSURE: 64 MMHG | BODY MASS INDEX: 46.68 KG/M2 | WEIGHT: 293 LBS

## 2017-11-16 DIAGNOSIS — B95.1 POSITIVE GBS TEST: ICD-10-CM

## 2017-11-16 DIAGNOSIS — G40.909 SEIZURE DISORDER DURING PREGNANCY IN THIRD TRIMESTER (HCC): Primary | ICD-10-CM

## 2017-11-16 DIAGNOSIS — G40.909 EPILEPSY AFFECTING PREGNANCY IN THIRD TRIMESTER (HCC): Primary | ICD-10-CM

## 2017-11-16 DIAGNOSIS — Q69.9 POLYDACTYLY: ICD-10-CM

## 2017-11-16 DIAGNOSIS — O99.353 EPILEPSY AFFECTING PREGNANCY IN THIRD TRIMESTER (HCC): Primary | ICD-10-CM

## 2017-11-16 DIAGNOSIS — Z3A.38 38 WEEKS GESTATION OF PREGNANCY: ICD-10-CM

## 2017-11-16 DIAGNOSIS — O99.353 SEIZURE DISORDER DURING PREGNANCY IN THIRD TRIMESTER (HCC): Primary | ICD-10-CM

## 2017-11-16 PROCEDURE — 99212 OFFICE O/P EST SF 10 MIN: CPT | Performed by: ADVANCED PRACTICE MIDWIFE

## 2017-11-16 RX ORDER — SODIUM CHLORIDE 0.9 % (FLUSH) 0.9 %
1-10 SYRINGE (ML) INJECTION AS NEEDED
Status: CANCELLED | OUTPATIENT
Start: 2017-11-16

## 2017-11-16 RX ORDER — LIDOCAINE HYDROCHLORIDE 10 MG/ML
5 INJECTION, SOLUTION INFILTRATION; PERINEURAL AS NEEDED
Status: CANCELLED | OUTPATIENT
Start: 2017-11-16

## 2017-11-16 RX ORDER — CARBOPROST TROMETHAMINE 250 UG/ML
250 INJECTION, SOLUTION INTRAMUSCULAR AS NEEDED
Status: CANCELLED | OUTPATIENT
Start: 2017-11-16

## 2017-11-16 RX ORDER — MISOPROSTOL 200 UG/1
800 TABLET ORAL AS NEEDED
Status: CANCELLED | OUTPATIENT
Start: 2017-11-16

## 2017-11-16 RX ORDER — ACETAMINOPHEN 325 MG/1
650 TABLET ORAL EVERY 4 HOURS PRN
Status: CANCELLED | OUTPATIENT
Start: 2017-11-16

## 2017-11-16 RX ORDER — METHYLERGONOVINE MALEATE 0.2 MG/ML
200 INJECTION INTRAVENOUS ONCE AS NEEDED
Status: CANCELLED | OUTPATIENT
Start: 2017-11-16

## 2017-11-16 RX ORDER — SODIUM CHLORIDE, SODIUM LACTATE, POTASSIUM CHLORIDE, CALCIUM CHLORIDE 600; 310; 30; 20 MG/100ML; MG/100ML; MG/100ML; MG/100ML
125 INJECTION, SOLUTION INTRAVENOUS CONTINUOUS
Status: CANCELLED | OUTPATIENT
Start: 2017-11-16

## 2017-11-16 RX ORDER — MINERAL OIL
OIL (ML) MISCELLANEOUS ONCE
Status: CANCELLED | OUTPATIENT
Start: 2017-11-16 | End: 2017-11-16

## 2017-11-16 RX ORDER — OXYTOCIN/0.9 % SODIUM CHLORIDE 30/500 ML
2-16 PLASTIC BAG, INJECTION (ML) INTRAVENOUS
Status: CANCELLED | OUTPATIENT
Start: 2017-11-28

## 2017-11-16 NOTE — PROGRESS NOTES
CC: ETHAN visit, history reviewed see history tabs.     ROS:   Negative leaking fluid from the vagina, swelling in her legs, headache, visual changes, low back pain and heartburn      Educated on:Patient declined IOL next week, educated on FKC twice daily and the risks, patient verbalized understanding. Agrees to be induced on 11/27. IOL scheduled. Educated on cervidil, pitocin and discussed cook balloon     A/Plan: f/u in 1 week/s   Tamanna was seen today for routine prenatal visit.    Diagnoses and all orders for this visit:    Seizure disorder during pregnancy in third trimester    Polydactyly    Positive GBS test    38 weeks gestation of pregnancy

## 2017-11-18 ENCOUNTER — HOSPITAL ENCOUNTER (OUTPATIENT)
Facility: HOSPITAL | Age: 32
Discharge: HOME | End: 2017-11-18
Attending: OBSTETRICS & GYNECOLOGY | Admitting: OBSTETRICS & GYNECOLOGY

## 2017-11-18 VITALS
TEMPERATURE: 98.1 F | OXYGEN SATURATION: 98 % | HEART RATE: 101 BPM | SYSTOLIC BLOOD PRESSURE: 131 MMHG | DIASTOLIC BLOOD PRESSURE: 65 MMHG

## 2017-11-18 PROCEDURE — G0463 HOSPITAL OUTPT CLINIC VISIT: HCPCS

## 2017-11-18 PROCEDURE — 59025 FETAL NON-STRESS TEST: CPT

## 2017-11-18 PROCEDURE — 59025 FETAL NON-STRESS TEST: CPT | Performed by: ADVANCED PRACTICE MIDWIFE

## 2017-11-18 NOTE — NON STRESS TEST
Tamanna Luna, a  at 38w3d with an TENA of 2017, by Last Menstrual Period, was seen at [facility] for a nonstress test.    Chief Complaint   Patient presents with   • Decreased Fetal Movement     pt states she was doing her fetal kick counts and only got 5 kicks in an hour and called the midwife and the midwife requested she get monitored. pt reports last time she felt baby move was around midnight. No reports of vaginal bleeding or leaking of fluid.        Interpretation A  Nonstress Test Interpretation A: Reactive (17 : Damaris Kim, RN)  Comments A: verified with antoinette HARTLEY (17 : Damaris Kim, NAEEM)

## 2017-11-18 NOTE — DISCHARGE INSTRUCTIONS
Please return if decreased fetal movement , any bright red vaginal bleeding or leaking of water like your water broke, painful regular contractions every 2-3 minutes apart. Make sure you keep your next appointment and drink plenty of water. Keep doing your kick counts.

## 2017-11-20 PROBLEM — O36.8130 DECREASED FETAL MOVEMENTS IN THIRD TRIMESTER: Status: ACTIVE | Noted: 2017-11-20

## 2017-11-21 ENCOUNTER — ROUTINE PRENATAL (OUTPATIENT)
Dept: OBSTETRICS AND GYNECOLOGY | Facility: CLINIC | Age: 32
End: 2017-11-21

## 2017-11-21 VITALS — DIASTOLIC BLOOD PRESSURE: 71 MMHG | WEIGHT: 293 LBS | SYSTOLIC BLOOD PRESSURE: 107 MMHG | BODY MASS INDEX: 46.38 KG/M2

## 2017-11-21 DIAGNOSIS — O99.353 SEIZURE DISORDER DURING PREGNANCY IN THIRD TRIMESTER (HCC): Primary | ICD-10-CM

## 2017-11-21 DIAGNOSIS — Z3A.38 38 WEEKS GESTATION OF PREGNANCY: ICD-10-CM

## 2017-11-21 DIAGNOSIS — G40.909 SEIZURE DISORDER DURING PREGNANCY IN THIRD TRIMESTER (HCC): Primary | ICD-10-CM

## 2017-11-21 DIAGNOSIS — B95.1 POSITIVE GBS TEST: ICD-10-CM

## 2017-11-21 PROCEDURE — 99212 OFFICE O/P EST SF 10 MIN: CPT | Performed by: ADVANCED PRACTICE MIDWIFE

## 2017-11-21 NOTE — PROGRESS NOTES
CC: ETHAN visit, history reviewed see history tabs.     ROS: Negative leaking fluid from the vagina, swelling in her legs, headache, visual changes, low back pain and heartburn    Objective: traced pedal edema bilaterally  Educated on:FKC, labor s/s    A/Plan: f/u 11/27/17 for IOL  Tamanna was seen today for routine prenatal visit.    Diagnoses and all orders for this visit:    Seizure disorder during pregnancy in third trimester    Positive GBS test    38 weeks gestation of pregnancy

## 2017-11-24 ENCOUNTER — HOSPITAL ENCOUNTER (OUTPATIENT)
Facility: HOSPITAL | Age: 32
Discharge: HOME | End: 2017-11-24
Attending: OBSTETRICS & GYNECOLOGY | Admitting: OBSTETRICS & GYNECOLOGY

## 2017-11-24 VITALS
DIASTOLIC BLOOD PRESSURE: 77 MMHG | TEMPERATURE: 97.9 F | RESPIRATION RATE: 18 BRPM | OXYGEN SATURATION: 100 % | HEART RATE: 86 BPM | SYSTOLIC BLOOD PRESSURE: 126 MMHG

## 2017-11-27 ENCOUNTER — HOSPITAL ENCOUNTER (OUTPATIENT)
Dept: LABOR AND DELIVERY | Facility: HOSPITAL | Age: 32
Discharge: HOME | End: 2017-11-27

## 2017-11-27 ENCOUNTER — HOSPITAL ENCOUNTER (INPATIENT)
Facility: HOSPITAL | Age: 32
LOS: 3 days | Discharge: HOME | End: 2017-11-30
Attending: OBSTETRICS & GYNECOLOGY | Admitting: OBSTETRICS & GYNECOLOGY

## 2017-11-27 DIAGNOSIS — G40.909 EPILEPSY AFFECTING PREGNANCY IN THIRD TRIMESTER (HCC): ICD-10-CM

## 2017-11-27 DIAGNOSIS — O99.353 SEIZURE DISORDER DURING PREGNANCY IN THIRD TRIMESTER (HCC): ICD-10-CM

## 2017-11-27 DIAGNOSIS — G40.909 SEIZURE DISORDER DURING PREGNANCY IN THIRD TRIMESTER (HCC): ICD-10-CM

## 2017-11-27 DIAGNOSIS — Z87.898 HISTORY OF SEIZURES: Primary | ICD-10-CM

## 2017-11-27 DIAGNOSIS — O99.353 EPILEPSY AFFECTING PREGNANCY IN THIRD TRIMESTER (HCC): ICD-10-CM

## 2017-11-27 LAB
ABO GROUP BLD: NORMAL
AMPHET+METHAMPHET UR QL: NEGATIVE
BARBITURATES UR QL SCN: NEGATIVE
BENZODIAZ UR QL SCN: NEGATIVE
BLD GP AB SCN SERPL QL: NEGATIVE
CANNABINOIDS SERPL QL: NEGATIVE
COCAINE UR QL: NEGATIVE
DEPRECATED RDW RBC AUTO: 45.6 FL (ref 36.4–46.3)
ERYTHROCYTE [DISTWIDTH] IN BLOOD BY AUTOMATED COUNT: 14.1 % (ref 11.5–14.5)
HCT VFR BLD AUTO: 35.7 % (ref 35–45)
HGB BLD-MCNC: 11.6 G/DL (ref 12–15.5)
Lab: NORMAL
MCH RBC QN AUTO: 28.6 PG (ref 26.5–34)
MCHC RBC AUTO-ENTMCNC: 32.5 G/DL (ref 31.4–36)
MCV RBC AUTO: 87.9 FL (ref 80–98)
METHADONE UR QL SCN: NEGATIVE
OPIATES UR QL: NEGATIVE
OXYCODONE UR QL SCN: NEGATIVE
PLATELET # BLD AUTO: 227 10*3/MM3 (ref 150–450)
PMV BLD AUTO: 10.5 FL (ref 8–12)
RBC # BLD AUTO: 4.06 10*6/MM3 (ref 3.77–5.16)
RH BLD: POSITIVE
WBC NRBC COR # BLD: 8.46 10*3/MM3 (ref 3.2–9.8)

## 2017-11-27 PROCEDURE — 80307 DRUG TEST PRSMV CHEM ANLYZR: CPT | Performed by: ADVANCED PRACTICE MIDWIFE

## 2017-11-27 PROCEDURE — 25010000002 PROMETHAZINE PER 50 MG: Performed by: OBSTETRICS & GYNECOLOGY

## 2017-11-27 PROCEDURE — 63710000001 DIPHENHYDRAMINE PER 50 MG

## 2017-11-27 PROCEDURE — 86850 RBC ANTIBODY SCREEN: CPT | Performed by: ADVANCED PRACTICE MIDWIFE

## 2017-11-27 PROCEDURE — 86900 BLOOD TYPING SEROLOGIC ABO: CPT | Performed by: ADVANCED PRACTICE MIDWIFE

## 2017-11-27 PROCEDURE — 86901 BLOOD TYPING SEROLOGIC RH(D): CPT | Performed by: ADVANCED PRACTICE MIDWIFE

## 2017-11-27 PROCEDURE — 25010000002 BUTORPHANOL PER 1 MG: Performed by: OBSTETRICS & GYNECOLOGY

## 2017-11-27 PROCEDURE — 85027 COMPLETE CBC AUTOMATED: CPT | Performed by: ADVANCED PRACTICE MIDWIFE

## 2017-11-27 RX ORDER — LEVETIRACETAM 500 MG/1
1000 TABLET ORAL 2 TIMES DAILY
Status: DISCONTINUED | OUTPATIENT
Start: 2017-11-28 | End: 2017-11-27

## 2017-11-27 RX ORDER — SODIUM CHLORIDE, SODIUM LACTATE, POTASSIUM CHLORIDE, CALCIUM CHLORIDE 600; 310; 30; 20 MG/100ML; MG/100ML; MG/100ML; MG/100ML
INJECTION, SOLUTION INTRAVENOUS
Status: DISPENSED
Start: 2017-11-27 | End: 2017-11-28

## 2017-11-27 RX ORDER — DIPHENHYDRAMINE HCL 25 MG
CAPSULE ORAL
Status: COMPLETED
Start: 2017-11-27 | End: 2017-11-27

## 2017-11-27 RX ORDER — PROMETHAZINE HYDROCHLORIDE 25 MG/ML
12.5 INJECTION, SOLUTION INTRAMUSCULAR; INTRAVENOUS ONCE
Status: COMPLETED | OUTPATIENT
Start: 2017-11-27 | End: 2017-11-27

## 2017-11-27 RX ORDER — LIDOCAINE HYDROCHLORIDE 10 MG/ML
5 INJECTION, SOLUTION INFILTRATION; PERINEURAL AS NEEDED
Status: DISCONTINUED | OUTPATIENT
Start: 2017-11-27 | End: 2017-11-28 | Stop reason: HOSPADM

## 2017-11-27 RX ORDER — SODIUM CHLORIDE 0.9 % (FLUSH) 0.9 %
1-10 SYRINGE (ML) INJECTION AS NEEDED
Status: DISCONTINUED | OUTPATIENT
Start: 2017-11-27 | End: 2017-11-28 | Stop reason: HOSPADM

## 2017-11-27 RX ORDER — SODIUM CHLORIDE, SODIUM LACTATE, POTASSIUM CHLORIDE, CALCIUM CHLORIDE 600; 310; 30; 20 MG/100ML; MG/100ML; MG/100ML; MG/100ML
INJECTION, SOLUTION INTRAVENOUS
Status: COMPLETED
Start: 2017-11-27 | End: 2017-11-27

## 2017-11-27 RX ORDER — ACETAMINOPHEN 325 MG/1
650 TABLET ORAL EVERY 4 HOURS PRN
Status: DISCONTINUED | OUTPATIENT
Start: 2017-11-27 | End: 2017-11-28 | Stop reason: HOSPADM

## 2017-11-27 RX ORDER — SODIUM CHLORIDE, SODIUM LACTATE, POTASSIUM CHLORIDE, CALCIUM CHLORIDE 600; 310; 30; 20 MG/100ML; MG/100ML; MG/100ML; MG/100ML
125 INJECTION, SOLUTION INTRAVENOUS CONTINUOUS
Status: DISCONTINUED | OUTPATIENT
Start: 2017-11-27 | End: 2017-11-28 | Stop reason: HOSPADM

## 2017-11-27 RX ORDER — DIPHENHYDRAMINE HCL 25 MG
25 CAPSULE ORAL NIGHTLY PRN
Status: DISCONTINUED | OUTPATIENT
Start: 2017-11-27 | End: 2017-11-28 | Stop reason: HOSPADM

## 2017-11-27 RX ORDER — LEVETIRACETAM 500 MG/1
1000 TABLET ORAL 2 TIMES DAILY
Status: DISCONTINUED | OUTPATIENT
Start: 2017-11-27 | End: 2017-11-28 | Stop reason: HOSPADM

## 2017-11-27 RX ORDER — MINERAL OIL
OIL (ML) MISCELLANEOUS ONCE
Status: DISCONTINUED | OUTPATIENT
Start: 2017-11-27 | End: 2017-11-28 | Stop reason: HOSPADM

## 2017-11-27 RX ORDER — PROMETHAZINE HYDROCHLORIDE 25 MG/ML
INJECTION, SOLUTION INTRAMUSCULAR; INTRAVENOUS
Status: DISPENSED
Start: 2017-11-27 | End: 2017-11-28

## 2017-11-27 RX ORDER — LEVETIRACETAM 500 MG/1
2000 TABLET ORAL 2 TIMES DAILY
Status: DISCONTINUED | OUTPATIENT
Start: 2017-11-27 | End: 2017-11-27

## 2017-11-27 RX ORDER — OXYTOCIN/0.9 % SODIUM CHLORIDE 30/500 ML
2-16 PLASTIC BAG, INJECTION (ML) INTRAVENOUS
Status: DISCONTINUED | OUTPATIENT
Start: 2017-11-28 | End: 2017-11-28 | Stop reason: HOSPADM

## 2017-11-27 RX ORDER — BUTORPHANOL TARTRATE 1 MG/ML
INJECTION, SOLUTION INTRAMUSCULAR; INTRAVENOUS
Status: DISPENSED
Start: 2017-11-27 | End: 2017-11-28

## 2017-11-27 RX ORDER — BUTORPHANOL TARTRATE 1 MG/ML
1 INJECTION, SOLUTION INTRAMUSCULAR; INTRAVENOUS ONCE
Status: COMPLETED | OUTPATIENT
Start: 2017-11-27 | End: 2017-11-27

## 2017-11-27 RX ADMIN — Medication 25 MG: at 21:46

## 2017-11-27 RX ADMIN — SODIUM CHLORIDE, POTASSIUM CHLORIDE, SODIUM LACTATE AND CALCIUM CHLORIDE 125 ML/HR: 600; 310; 30; 20 INJECTION, SOLUTION INTRAVENOUS at 15:32

## 2017-11-27 RX ADMIN — LEVETIRACETAM 2000 MG: 500 TABLET, FILM COATED ORAL at 21:23

## 2017-11-27 RX ADMIN — SODIUM CHLORIDE, POTASSIUM CHLORIDE, SODIUM LACTATE AND CALCIUM CHLORIDE 125 ML/HR: 600; 310; 30; 20 INJECTION, SOLUTION INTRAVENOUS at 15:33

## 2017-11-27 RX ADMIN — BUTORPHANOL TARTRATE 1 MG: 1 INJECTION, SOLUTION INTRAMUSCULAR; INTRAVENOUS at 23:35

## 2017-11-27 RX ADMIN — SODIUM CHLORIDE, POTASSIUM CHLORIDE, SODIUM LACTATE AND CALCIUM CHLORIDE 125 ML/HR: 600; 310; 30; 20 INJECTION, SOLUTION INTRAVENOUS at 20:19

## 2017-11-27 RX ADMIN — LEVETIRACETAM 1000 MG: 500 TABLET, FILM COATED ORAL at 21:28

## 2017-11-27 RX ADMIN — DIPHENHYDRAMINE HYDROCHLORIDE 25 MG: 25 CAPSULE ORAL at 21:46

## 2017-11-27 RX ADMIN — PROMETHAZINE HYDROCHLORIDE 12.5 MG: 25 INJECTION INTRAMUSCULAR; INTRAVENOUS at 23:34

## 2017-11-27 RX ADMIN — DINOPROSTONE 10 MG: 10 INSERT VAGINAL at 17:07

## 2017-11-27 RX ADMIN — VANCOMYCIN HYDROCHLORIDE 1 G: 1 INJECTION, POWDER, LYOPHILIZED, FOR SOLUTION INTRAVENOUS at 21:47

## 2017-11-28 ENCOUNTER — ANESTHESIA (OUTPATIENT)
Dept: LABOR AND DELIVERY | Facility: HOSPITAL | Age: 32
End: 2017-11-28

## 2017-11-28 ENCOUNTER — ANESTHESIA EVENT (OUTPATIENT)
Dept: LABOR AND DELIVERY | Facility: HOSPITAL | Age: 32
End: 2017-11-28

## 2017-11-28 PROBLEM — Z87.898 HISTORY OF SEIZURES: Status: ACTIVE | Noted: 2017-11-28

## 2017-11-28 LAB
ARTERIAL PATENCY WRIST A: ABNORMAL
ATMOSPHERIC PRESS: ABNORMAL MMHG
ATMOSPHERIC PRESS: ABNORMAL MMHG
BASE EXCESS BLDA CALC-SCNC: -6.5 MMOL/L (ref -2.4–2.4)
BASE EXCESS BLDV CALC-SCNC: -5.8 MMOL/L (ref -2.4–2.4)
BDY SITE: ABNORMAL
BDY SITE: ABNORMAL
CA-I BLD-MCNC: 5.8 MG/DL (ref 4.5–4.9)
CA-I BLD-MCNC: 5.8 MG/DL (ref 4.5–4.9)
CO2 BLDA-SCNC: 27.5 MMOL/L (ref 23–27)
CO2 BLDA-SCNC: 27.8 MMOL/L (ref 23–27)
GLUCOSE BLDA-MCNC: 82 MMOL/L
HCO3 BLDA-SCNC: 25.2 MMOL/L (ref 22–26)
HCO3 BLDV-SCNC: 25.1 MMOL/L
HCT VFR BLD CALC: 42 % (ref 38–47)
HGB BLDA-MCNC: 14.3 G/DL (ref 12–16)
HGB BLDA-MCNC: 14.5 G/DL (ref 12–16)
MODALITY: ABNORMAL
MODALITY: ABNORMAL
PCO2 BLDA: 83.4 MM HG (ref 35–45)
PCO2 BLDV: 76.7 MM HG
PH BLDA: 7.1 PH UNITS (ref 7.35–7.45)
PH BLDV: 7.13 PH UNITS (ref 7.31–7.42)
PO2 BLDA: 16.5 MM HG (ref 80–105)
PO2 BLDV: 15.3 MM HG
POTASSIUM BLDA-SCNC: 4.8 MMOL/L (ref 3.6–4.9)
POTASSIUM BLDV-SCNC: 4.7 MMOL/L (ref 3.5–4.9)
SAO2 % BLDCOA: 17.2 % (ref 94–100)
SAO2 % BLDCOV: 19 %
SODIUM BLDA-SCNC: 133.9 MMOL/L (ref 138–146)
SODIUM BLDV-SCNC: 134.8 MMOL/L (ref 120–160)

## 2017-11-28 PROCEDURE — C1755 CATHETER, INTRASPINAL: HCPCS

## 2017-11-28 PROCEDURE — 3E033VJ INTRODUCTION OF OTHER HORMONE INTO PERIPHERAL VEIN, PERCUTANEOUS APPROACH: ICD-10-PCS | Performed by: OBSTETRICS & GYNECOLOGY

## 2017-11-28 PROCEDURE — 88307 TISSUE EXAM BY PATHOLOGIST: CPT | Performed by: OBSTETRICS & GYNECOLOGY

## 2017-11-28 PROCEDURE — 90471 IMMUNIZATION ADMIN: CPT | Performed by: OBSTETRICS & GYNECOLOGY

## 2017-11-28 PROCEDURE — 90682 RIV4 VACC RECOMBINANT DNA IM: CPT | Performed by: OBSTETRICS & GYNECOLOGY

## 2017-11-28 PROCEDURE — G0008 ADMIN INFLUENZA VIRUS VAC: HCPCS | Performed by: OBSTETRICS & GYNECOLOGY

## 2017-11-28 PROCEDURE — 25010000002 PROMETHAZINE PER 50 MG: Performed by: OBSTETRICS & GYNECOLOGY

## 2017-11-28 PROCEDURE — 25010000002 MIDAZOLAM PER 1 MG: Performed by: NURSE ANESTHETIST, CERTIFIED REGISTERED

## 2017-11-28 PROCEDURE — 25010000002 GENTAMICIN PER 80 MG: Performed by: OBSTETRICS & GYNECOLOGY

## 2017-11-28 PROCEDURE — 88307 TISSUE EXAM BY PATHOLOGIST: CPT | Performed by: PATHOLOGY

## 2017-11-28 PROCEDURE — 94760 N-INVAS EAR/PLS OXIMETRY 1: CPT

## 2017-11-28 PROCEDURE — 25010000002 ONDANSETRON PER 1 MG: Performed by: NURSE ANESTHETIST, CERTIFIED REGISTERED

## 2017-11-28 PROCEDURE — 25010000002 BUTORPHANOL PER 1 MG

## 2017-11-28 PROCEDURE — 90715 TDAP VACCINE 7 YRS/> IM: CPT | Performed by: OBSTETRICS & GYNECOLOGY

## 2017-11-28 PROCEDURE — 25010000002 TDAP 5-2.5-18.5 LF-MCG/0.5 SUSPENSION: Performed by: OBSTETRICS & GYNECOLOGY

## 2017-11-28 PROCEDURE — 94799 UNLISTED PULMONARY SVC/PX: CPT

## 2017-11-28 PROCEDURE — 25010000002 PHENYLEPHRINE PER 1 ML: Performed by: NURSE ANESTHETIST, CERTIFIED REGISTERED

## 2017-11-28 PROCEDURE — 25010000002 INFLUENZA VAC SPLIT QUAD SUSPENSION: Performed by: OBSTETRICS & GYNECOLOGY

## 2017-11-28 PROCEDURE — 82803 BLOOD GASES ANY COMBINATION: CPT | Performed by: OBSTETRICS & GYNECOLOGY

## 2017-11-28 PROCEDURE — 3E0P7VZ INTRODUCTION OF HORMONE INTO FEMALE REPRODUCTIVE, VIA NATURAL OR ARTIFICIAL OPENING: ICD-10-PCS | Performed by: OBSTETRICS & GYNECOLOGY

## 2017-11-28 PROCEDURE — 25010000002 HYDROMORPHONE HCL-NACL 6-0.9 MG/30ML-% SOLUTION PREFILLED SYRINGE: Performed by: OBSTETRICS & GYNECOLOGY

## 2017-11-28 PROCEDURE — 51703 INSERT BLADDER CATH COMPLEX: CPT

## 2017-11-28 PROCEDURE — 59514 CESAREAN DELIVERY ONLY: CPT | Performed by: OBSTETRICS & GYNECOLOGY

## 2017-11-28 PROCEDURE — 25010000002 ONDANSETRON PER 1 MG: Performed by: OBSTETRICS & GYNECOLOGY

## 2017-11-28 RX ORDER — OXYCODONE AND ACETAMINOPHEN 10; 325 MG/1; MG/1
1 TABLET ORAL EVERY 4 HOURS PRN
Status: DISCONTINUED | OUTPATIENT
Start: 2017-11-28 | End: 2017-11-30 | Stop reason: HOSPADM

## 2017-11-28 RX ORDER — DIPHENHYDRAMINE HCL 25 MG
25 CAPSULE ORAL NIGHTLY PRN
Status: DISCONTINUED | OUTPATIENT
Start: 2017-11-28 | End: 2017-11-28 | Stop reason: HOSPADM

## 2017-11-28 RX ORDER — MIDAZOLAM HYDROCHLORIDE 1 MG/ML
INJECTION INTRAMUSCULAR; INTRAVENOUS AS NEEDED
Status: DISCONTINUED | OUTPATIENT
Start: 2017-11-28 | End: 2017-11-28 | Stop reason: SURG

## 2017-11-28 RX ORDER — SODIUM CHLORIDE, SODIUM LACTATE, POTASSIUM CHLORIDE, CALCIUM CHLORIDE 600; 310; 30; 20 MG/100ML; MG/100ML; MG/100ML; MG/100ML
INJECTION, SOLUTION INTRAVENOUS
Status: COMPLETED
Start: 2017-11-28 | End: 2017-11-28

## 2017-11-28 RX ORDER — DOCUSATE SODIUM 100 MG/1
100 CAPSULE, LIQUID FILLED ORAL 2 TIMES DAILY PRN
Status: DISCONTINUED | OUTPATIENT
Start: 2017-11-28 | End: 2017-11-30 | Stop reason: HOSPADM

## 2017-11-28 RX ORDER — DIPHENHYDRAMINE HYDROCHLORIDE 50 MG/ML
25 INJECTION INTRAMUSCULAR; INTRAVENOUS EVERY 4 HOURS PRN
Status: DISCONTINUED | OUTPATIENT
Start: 2017-11-28 | End: 2017-11-30 | Stop reason: HOSPADM

## 2017-11-28 RX ORDER — MISOPROSTOL 200 UG/1
800 TABLET ORAL AS NEEDED
Status: DISCONTINUED | OUTPATIENT
Start: 2017-11-28 | End: 2017-11-28 | Stop reason: HOSPADM

## 2017-11-28 RX ORDER — PROMETHAZINE HYDROCHLORIDE 12.5 MG/1
12.5 SUPPOSITORY RECTAL EVERY 6 HOURS PRN
Status: DISCONTINUED | OUTPATIENT
Start: 2017-11-28 | End: 2017-11-28 | Stop reason: SDUPTHER

## 2017-11-28 RX ORDER — HYDROXYZINE HYDROCHLORIDE 25 MG/1
50 TABLET, FILM COATED ORAL EVERY 6 HOURS PRN
Status: DISCONTINUED | OUTPATIENT
Start: 2017-11-28 | End: 2017-11-30 | Stop reason: HOSPADM

## 2017-11-28 RX ORDER — ONDANSETRON 2 MG/ML
4 INJECTION INTRAMUSCULAR; INTRAVENOUS EVERY 6 HOURS PRN
Status: DISCONTINUED | OUTPATIENT
Start: 2017-11-28 | End: 2017-11-28 | Stop reason: SDUPTHER

## 2017-11-28 RX ORDER — CLINDAMYCIN PHOSPHATE 900 MG/50ML
900 INJECTION INTRAVENOUS ONCE
Status: COMPLETED | OUTPATIENT
Start: 2017-11-28 | End: 2017-11-28

## 2017-11-28 RX ORDER — LEVETIRACETAM 500 MG/1
1000 TABLET ORAL 2 TIMES DAILY
Status: DISCONTINUED | OUTPATIENT
Start: 2017-11-28 | End: 2017-11-30 | Stop reason: HOSPADM

## 2017-11-28 RX ORDER — ACETAMINOPHEN 325 MG/1
650 TABLET ORAL EVERY 4 HOURS PRN
Status: DISCONTINUED | OUTPATIENT
Start: 2017-11-28 | End: 2017-11-28 | Stop reason: HOSPADM

## 2017-11-28 RX ORDER — ONDANSETRON 2 MG/ML
4 INJECTION INTRAMUSCULAR; INTRAVENOUS ONCE AS NEEDED
Status: DISCONTINUED | OUTPATIENT
Start: 2017-11-28 | End: 2017-11-28 | Stop reason: SDUPTHER

## 2017-11-28 RX ORDER — DIPHENHYDRAMINE HCL 25 MG
25 CAPSULE ORAL EVERY 4 HOURS PRN
Status: DISCONTINUED | OUTPATIENT
Start: 2017-11-28 | End: 2017-11-30 | Stop reason: HOSPADM

## 2017-11-28 RX ORDER — NALOXONE HCL 0.4 MG/ML
0.2 VIAL (ML) INJECTION
Status: DISCONTINUED | OUTPATIENT
Start: 2017-11-28 | End: 2017-11-30 | Stop reason: HOSPADM

## 2017-11-28 RX ORDER — OXYCODONE AND ACETAMINOPHEN 10; 325 MG/1; MG/1
1 TABLET ORAL EVERY 4 HOURS PRN
Status: DISCONTINUED | OUTPATIENT
Start: 2017-11-28 | End: 2017-11-28 | Stop reason: HOSPADM

## 2017-11-28 RX ORDER — BUTORPHANOL TARTRATE 1 MG/ML
INJECTION, SOLUTION INTRAMUSCULAR; INTRAVENOUS
Status: COMPLETED
Start: 2017-11-28 | End: 2017-11-28

## 2017-11-28 RX ORDER — NALOXONE HCL 0.4 MG/ML
0.1 VIAL (ML) INJECTION
Status: DISCONTINUED | OUTPATIENT
Start: 2017-11-28 | End: 2017-11-28 | Stop reason: SDUPTHER

## 2017-11-28 RX ORDER — PROMETHAZINE HYDROCHLORIDE 25 MG/ML
12.5 INJECTION, SOLUTION INTRAMUSCULAR; INTRAVENOUS EVERY 6 HOURS PRN
Status: DISCONTINUED | OUTPATIENT
Start: 2017-11-28 | End: 2017-11-30 | Stop reason: HOSPADM

## 2017-11-28 RX ORDER — PROMETHAZINE HYDROCHLORIDE 25 MG/1
25 TABLET ORAL EVERY 6 HOURS PRN
Status: DISCONTINUED | OUTPATIENT
Start: 2017-11-28 | End: 2017-11-30 | Stop reason: HOSPADM

## 2017-11-28 RX ORDER — ONDANSETRON 2 MG/ML
4 INJECTION INTRAMUSCULAR; INTRAVENOUS EVERY 6 HOURS PRN
Status: DISCONTINUED | OUTPATIENT
Start: 2017-11-28 | End: 2017-11-30 | Stop reason: HOSPADM

## 2017-11-28 RX ORDER — HYDROMORPHONE HCL IN 0.9% NACL 0.2 MG/ML
PLASTIC BAG, INJECTION (ML) INTRAVENOUS CONTINUOUS
Status: DISCONTINUED | OUTPATIENT
Start: 2017-11-28 | End: 2017-11-30 | Stop reason: HOSPADM

## 2017-11-28 RX ORDER — LANOLIN 100 %
OINTMENT (GRAM) TOPICAL
Status: DISCONTINUED | OUTPATIENT
Start: 2017-11-28 | End: 2017-11-30 | Stop reason: HOSPADM

## 2017-11-28 RX ORDER — PROMETHAZINE HYDROCHLORIDE 25 MG/ML
12.5 INJECTION, SOLUTION INTRAMUSCULAR; INTRAVENOUS ONCE
Status: COMPLETED | OUTPATIENT
Start: 2017-11-28 | End: 2017-11-28

## 2017-11-28 RX ORDER — PROMETHAZINE HYDROCHLORIDE 12.5 MG/1
12.5 TABLET ORAL EVERY 6 HOURS PRN
Status: DISCONTINUED | OUTPATIENT
Start: 2017-11-28 | End: 2017-11-28 | Stop reason: HOSPADM

## 2017-11-28 RX ORDER — PROMETHAZINE HYDROCHLORIDE 12.5 MG/1
12.5 SUPPOSITORY RECTAL EVERY 6 HOURS PRN
Status: DISCONTINUED | OUTPATIENT
Start: 2017-11-28 | End: 2017-11-28 | Stop reason: HOSPADM

## 2017-11-28 RX ORDER — IBUPROFEN 600 MG/1
600 TABLET ORAL EVERY 8 HOURS PRN
Status: DISCONTINUED | OUTPATIENT
Start: 2017-11-28 | End: 2017-11-28 | Stop reason: DRUGHIGH

## 2017-11-28 RX ORDER — CARBOPROST TROMETHAMINE 250 UG/ML
250 INJECTION, SOLUTION INTRAMUSCULAR AS NEEDED
Status: DISCONTINUED | OUTPATIENT
Start: 2017-11-28 | End: 2017-11-28 | Stop reason: HOSPADM

## 2017-11-28 RX ORDER — OXYTOCIN/RINGER'S LACTATE 20/1000 ML
999 PLASTIC BAG, INJECTION (ML) INTRAVENOUS ONCE
Status: DISCONTINUED | OUTPATIENT
Start: 2017-11-28 | End: 2017-11-28 | Stop reason: HOSPADM

## 2017-11-28 RX ORDER — DIPHENHYDRAMINE HYDROCHLORIDE 50 MG/ML
25 INJECTION INTRAMUSCULAR; INTRAVENOUS EVERY 4 HOURS PRN
Status: DISCONTINUED | OUTPATIENT
Start: 2017-11-28 | End: 2017-11-28 | Stop reason: SDUPTHER

## 2017-11-28 RX ORDER — PROMETHAZINE HYDROCHLORIDE 25 MG/ML
12.5 INJECTION, SOLUTION INTRAMUSCULAR; INTRAVENOUS EVERY 6 HOURS PRN
Status: DISCONTINUED | OUTPATIENT
Start: 2017-11-28 | End: 2017-11-28 | Stop reason: HOSPADM

## 2017-11-28 RX ORDER — ONDANSETRON 4 MG/1
4 TABLET, FILM COATED ORAL EVERY 6 HOURS PRN
Status: DISCONTINUED | OUTPATIENT
Start: 2017-11-28 | End: 2017-11-28 | Stop reason: HOSPADM

## 2017-11-28 RX ORDER — KETOROLAC TROMETHAMINE 30 MG/ML
30 INJECTION, SOLUTION INTRAMUSCULAR; INTRAVENOUS EVERY 6 HOURS PRN
Status: ACTIVE | OUTPATIENT
Start: 2017-11-28 | End: 2017-11-28

## 2017-11-28 RX ORDER — IBUPROFEN 600 MG/1
600 TABLET ORAL EVERY 6 HOURS PRN
Status: DISCONTINUED | OUTPATIENT
Start: 2017-11-28 | End: 2017-11-28 | Stop reason: HOSPADM

## 2017-11-28 RX ORDER — ONDANSETRON 2 MG/ML
INJECTION INTRAMUSCULAR; INTRAVENOUS AS NEEDED
Status: DISCONTINUED | OUTPATIENT
Start: 2017-11-28 | End: 2017-11-28 | Stop reason: SURG

## 2017-11-28 RX ORDER — METHYLERGONOVINE MALEATE 0.2 MG/ML
200 INJECTION INTRAVENOUS ONCE AS NEEDED
Status: DISCONTINUED | OUTPATIENT
Start: 2017-11-28 | End: 2017-11-28 | Stop reason: HOSPADM

## 2017-11-28 RX ORDER — OXYTOCIN/RINGER'S LACTATE 20/1000 ML
PLASTIC BAG, INJECTION (ML) INTRAVENOUS AS NEEDED
Status: DISCONTINUED | OUTPATIENT
Start: 2017-11-28 | End: 2017-11-28 | Stop reason: SURG

## 2017-11-28 RX ORDER — BISACODYL 10 MG
10 SUPPOSITORY, RECTAL RECTAL DAILY PRN
Status: DISCONTINUED | OUTPATIENT
Start: 2017-11-28 | End: 2017-11-30 | Stop reason: HOSPADM

## 2017-11-28 RX ORDER — BUPIVACAINE HYDROCHLORIDE 7.5 MG/ML
INJECTION, SOLUTION EPIDURAL; RETROBULBAR AS NEEDED
Status: DISCONTINUED | OUTPATIENT
Start: 2017-11-28 | End: 2017-11-28 | Stop reason: SURG

## 2017-11-28 RX ORDER — OXYTOCIN/RINGER'S LACTATE 20/1000 ML
1000 PLASTIC BAG, INJECTION (ML) INTRAVENOUS CONTINUOUS
Status: DISPENSED | OUTPATIENT
Start: 2017-11-28 | End: 2017-11-28

## 2017-11-28 RX ORDER — OXYCODONE HYDROCHLORIDE AND ACETAMINOPHEN 5; 325 MG/1; MG/1
1 TABLET ORAL EVERY 4 HOURS PRN
Status: DISCONTINUED | OUTPATIENT
Start: 2017-11-28 | End: 2017-11-30 | Stop reason: HOSPADM

## 2017-11-28 RX ORDER — PRENATAL VIT/IRON FUM/FOLIC AC 27MG-0.8MG
1 TABLET ORAL DAILY
Status: DISCONTINUED | OUTPATIENT
Start: 2017-11-28 | End: 2017-11-30 | Stop reason: HOSPADM

## 2017-11-28 RX ORDER — ONDANSETRON 4 MG/1
4 TABLET, ORALLY DISINTEGRATING ORAL EVERY 6 HOURS PRN
Status: DISCONTINUED | OUTPATIENT
Start: 2017-11-28 | End: 2017-11-28 | Stop reason: HOSPADM

## 2017-11-28 RX ORDER — SODIUM CHLORIDE 0.9 % (FLUSH) 0.9 %
1-10 SYRINGE (ML) INJECTION AS NEEDED
Status: DISCONTINUED | OUTPATIENT
Start: 2017-11-28 | End: 2017-11-30 | Stop reason: HOSPADM

## 2017-11-28 RX ORDER — BUTORPHANOL TARTRATE 1 MG/ML
1 INJECTION, SOLUTION INTRAMUSCULAR; INTRAVENOUS ONCE
Status: COMPLETED | OUTPATIENT
Start: 2017-11-28 | End: 2017-11-28

## 2017-11-28 RX ORDER — OXYTOCIN/RINGER'S LACTATE 20/1000 ML
125 PLASTIC BAG, INJECTION (ML) INTRAVENOUS AS NEEDED
Status: DISCONTINUED | OUTPATIENT
Start: 2017-11-28 | End: 2017-11-28 | Stop reason: HOSPADM

## 2017-11-28 RX ORDER — DIPHENHYDRAMINE HYDROCHLORIDE 50 MG/ML
25 INJECTION INTRAMUSCULAR; INTRAVENOUS NIGHTLY PRN
Status: DISCONTINUED | OUTPATIENT
Start: 2017-11-28 | End: 2017-11-28 | Stop reason: HOSPADM

## 2017-11-28 RX ORDER — TRISODIUM CITRATE DIHYDRATE AND CITRIC ACID MONOHYDRATE 500; 334 MG/5ML; MG/5ML
30 SOLUTION ORAL ONCE
Status: COMPLETED | OUTPATIENT
Start: 2017-11-28 | End: 2017-11-28

## 2017-11-28 RX ORDER — ONDANSETRON 4 MG/1
4 TABLET, FILM COATED ORAL EVERY 8 HOURS PRN
Status: DISCONTINUED | OUTPATIENT
Start: 2017-11-28 | End: 2017-11-28 | Stop reason: SDUPTHER

## 2017-11-28 RX ADMIN — PRENATAL VIT W/ FE FUMARATE-FA TAB 27-0.8 MG 1 TABLET: 27-0.8 TAB at 08:29

## 2017-11-28 RX ADMIN — Medication: at 06:00

## 2017-11-28 RX ADMIN — Medication 150 ML: at 04:35

## 2017-11-28 RX ADMIN — CLINDAMYCIN IN 5 PERCENT DEXTROSE 900 MG: 18 INJECTION, SOLUTION INTRAVENOUS at 04:18

## 2017-11-28 RX ADMIN — TETANUS TOXOID, REDUCED DIPHTHERIA TOXOID AND ACELLULAR PERTUSSIS VACCINE, ADSORBED 0.5 ML: 5; 2.5; 8; 8; 2.5 SUSPENSION INTRAMUSCULAR at 13:40

## 2017-11-28 RX ADMIN — INFLUENZA A VIRUS A/MICHIGAN/45/2015 X-275 (H1N1) ANTIGEN (FORMALDEHYDE INACTIVATED), INFLUENZA A VIRUS A/HONG KONG/4801/2014 X-263B (H3N2) ANTIGEN (FORMALDEHYDE INACTIVATED), INFLUENZA B VIRUS B/PHUKET/3073/2013 ANTIGEN (FORMALDEHYDE INACTIVATED), AND INFLUENZA B VIRUS B/BRISBANE/60/2008 ANTIGEN (FORMALDEHYDE INACTIVATED) 0.5 ML: 15; 15; 15; 15 INJECTION, SUSPENSION INTRAMUSCULAR at 13:35

## 2017-11-28 RX ADMIN — OXYCODONE HYDROCHLORIDE AND ACETAMINOPHEN 1 TABLET: 10; 325 TABLET ORAL at 22:00

## 2017-11-28 RX ADMIN — ONDANSETRON 4 MG: 2 INJECTION INTRAMUSCULAR; INTRAVENOUS at 04:29

## 2017-11-28 RX ADMIN — ONDANSETRON 4 MG: 2 INJECTION INTRAMUSCULAR; INTRAVENOUS at 21:57

## 2017-11-28 RX ADMIN — Medication 50 ML: at 04:29

## 2017-11-28 RX ADMIN — BUTORPHANOL TARTRATE 1 MG: 1 INJECTION, SOLUTION INTRAMUSCULAR; INTRAVENOUS at 02:21

## 2017-11-28 RX ADMIN — Medication 100 ML: at 04:48

## 2017-11-28 RX ADMIN — GENTAMICIN SULFATE 470 MG: 40 INJECTION, SOLUTION INTRAMUSCULAR; INTRAVENOUS at 04:36

## 2017-11-28 RX ADMIN — PROMETHAZINE HYDROCHLORIDE 12.5 MG: 25 INJECTION INTRAMUSCULAR; INTRAVENOUS at 02:21

## 2017-11-28 RX ADMIN — LEVETIRACETAM 1000 MG: 500 TABLET, FILM COATED ORAL at 08:29

## 2017-11-28 RX ADMIN — BUPIVACAINE HYDROCHLORIDE 1.5 ML: 7.5 INJECTION, SOLUTION EPIDURAL; RETROBULBAR at 04:14

## 2017-11-28 RX ADMIN — SODIUM CHLORIDE, POTASSIUM CHLORIDE, SODIUM LACTATE AND CALCIUM CHLORIDE 125 ML: 600; 310; 30; 20 INJECTION, SOLUTION INTRAVENOUS at 16:51

## 2017-11-28 RX ADMIN — MIDAZOLAM 2.5 MG: 1 INJECTION INTRAMUSCULAR; INTRAVENOUS at 04:35

## 2017-11-28 RX ADMIN — OXYTOCIN 125 ML/HR: 10 INJECTION INTRAVENOUS at 08:31

## 2017-11-28 RX ADMIN — MIDAZOLAM 2.5 MG: 1 INJECTION INTRAMUSCULAR; INTRAVENOUS at 04:29

## 2017-11-28 RX ADMIN — PHENYLEPHRINE HYDROCHLORIDE 100 MCG: 10 INJECTION INTRAVENOUS at 04:17

## 2017-11-28 RX ADMIN — PHENYLEPHRINE HYDROCHLORIDE 100 MCG: 10 INJECTION INTRAVENOUS at 04:42

## 2017-11-28 RX ADMIN — SODIUM CITRATE AND CITRIC ACID MONOHYDRATE 30 ML: 500; 334 SOLUTION ORAL at 03:37

## 2017-11-28 RX ADMIN — LEVETIRACETAM 1000 MG: 500 TABLET, FILM COATED ORAL at 19:58

## 2017-11-28 RX ADMIN — Medication 200 ML: at 05:01

## 2017-11-28 NOTE — ANESTHESIA POSTPROCEDURE EVALUATION
Patient: Tamanna Luna    Procedure Summary     Date Anesthesia Start Anesthesia Stop Room / Location    [id number] [facility] / [facility]       Procedure Diagnosis Surgeon Provider     SECTION PRIMARY (N/A Abdomen) Non-reassuring electronic fetal monitoring tracing; History of seizures; Obesity affecting pregnancy  (non-reassuring fetal heart rate tracing) Christina COREY FridayMD Yasmeen, VÍCTOR          Anesthesia Type: spinal  Last vitals  BP   150/73 (17)   Temp   99.1 °F (37.3 °C) (17)   Pulse   99 (17)   Resp   18 (17)     SpO2   98 % (17)     Post Anesthesia Care and Evaluation    Patient location during evaluation: bedside  Patient participation: complete - patient participated  Level of consciousness: awake and alert  Pain score: 0  Pain management: adequate  Airway patency: patent  Anesthetic complications: No anesthetic complications  PONV Status: none  Cardiovascular status: acceptable  Respiratory status: acceptable  Hydration status: acceptable  Post Neuraxial Block status: No signs or symptoms of PDPH

## 2017-11-28 NOTE — ANESTHESIA PREPROCEDURE EVALUATION
Anesthesia Evaluation     Patient summary reviewed and Nursing notes reviewed   NPO Solid Status: Waived due to emergency  NPO Liquid Status: < 2 hours     Airway   Mallampati: II  TM distance: >3 FB  Neck ROM: limited  Dental - normal exam     Pulmonary - normal exam   Cardiovascular - normal exam        Neuro/Psych  (+) seizures well controlled, headaches,    GI/Hepatic/Renal/Endo    (+) obesity,      Musculoskeletal     Abdominal    Substance History      OB/GYN    (+) Pregnant,     Comment: 38 week cervidil induction      Other                                        Anesthesia Plan    ASA 3 - emergent     spinal     Anesthetic plan and risks discussed with patient.

## 2017-11-28 NOTE — ANESTHESIA PROCEDURE NOTES
Spinal Block    Patient location during procedure: OB  Start Time: 11/28/2017 4:01 AM  Stop Time: 11/28/2017 4:14 AM  Performed By  CRNA: ROSANA PHILLIPS  Preanesthetic Checklist  Completed: patient identified, site marked, surgical consent, pre-op evaluation, timeout performed, IV checked, risks and benefits discussed and monitors and equipment checked  Spinal Block Prep:  Patient Position:sitting  Sterile Tech:cap, gloves, mask and sterile barriers  Prep:Chloraprep  Patient Monitoring:blood pressure monitoring, continuous pulse oximetry and EKG  Spinal Block Procedure  Approach:midline  Guidance:landmark technique and palpation technique  Location:L3-L4  Needle Type:Sprotte  Needle Gauge:24 G  Placement of Spinal needle event:cerebrospinal fluid aspirated  Paresthesia: no  Fluid Appearance:clear  Post Assessment  Patient Tolerance:patient tolerated the procedure well with no apparent complications  Complications no

## 2017-11-29 LAB
BASOPHILS # BLD AUTO: 0.02 10*3/MM3 (ref 0–0.2)
BASOPHILS NFR BLD AUTO: 0.1 % (ref 0–2)
DEPRECATED RDW RBC AUTO: 46.1 FL (ref 36.4–46.3)
EOSINOPHIL # BLD AUTO: 0.04 10*3/MM3 (ref 0–0.7)
EOSINOPHIL NFR BLD AUTO: 0.3 % (ref 0–7)
ERYTHROCYTE [DISTWIDTH] IN BLOOD BY AUTOMATED COUNT: 14.2 % (ref 11.5–14.5)
HCT VFR BLD AUTO: 32.2 % (ref 35–45)
HGB BLD-MCNC: 10.3 G/DL (ref 12–15.5)
IMM GRANULOCYTES # BLD: 0.05 10*3/MM3 (ref 0–0.02)
IMM GRANULOCYTES NFR BLD: 0.3 % (ref 0–0.5)
LAB AP CASE REPORT: NORMAL
LYMPHOCYTES # BLD AUTO: 1.73 10*3/MM3 (ref 0.6–4.2)
LYMPHOCYTES NFR BLD AUTO: 12 % (ref 10–50)
Lab: NORMAL
MCH RBC QN AUTO: 28.1 PG (ref 26.5–34)
MCHC RBC AUTO-ENTMCNC: 32 G/DL (ref 31.4–36)
MCV RBC AUTO: 88 FL (ref 80–98)
MONOCYTES # BLD AUTO: 1.23 10*3/MM3 (ref 0–0.9)
MONOCYTES NFR BLD AUTO: 8.5 % (ref 0–12)
NEUTROPHILS # BLD AUTO: 11.35 10*3/MM3 (ref 2–8.6)
NEUTROPHILS NFR BLD AUTO: 78.8 % (ref 37–80)
PATH REPORT.FINAL DX SPEC: NORMAL
PATH REPORT.GROSS SPEC: NORMAL
PLATELET # BLD AUTO: 207 10*3/MM3 (ref 150–450)
PMV BLD AUTO: 10.2 FL (ref 8–12)
RBC # BLD AUTO: 3.66 10*6/MM3 (ref 3.77–5.16)
WBC NRBC COR # BLD: 14.42 10*3/MM3 (ref 3.2–9.8)

## 2017-11-29 PROCEDURE — 85025 COMPLETE CBC W/AUTO DIFF WBC: CPT | Performed by: OBSTETRICS & GYNECOLOGY

## 2017-11-29 RX ADMIN — OXYCODONE HYDROCHLORIDE AND ACETAMINOPHEN 1 TABLET: 10; 325 TABLET ORAL at 20:36

## 2017-11-29 RX ADMIN — LEVETIRACETAM 1000 MG: 500 TABLET, FILM COATED ORAL at 17:24

## 2017-11-29 RX ADMIN — PRENATAL VIT W/ FE FUMARATE-FA TAB 27-0.8 MG 1 TABLET: 27-0.8 TAB at 09:18

## 2017-11-29 RX ADMIN — OXYCODONE HYDROCHLORIDE AND ACETAMINOPHEN 1 TABLET: 10; 325 TABLET ORAL at 12:07

## 2017-11-29 RX ADMIN — OXYCODONE HYDROCHLORIDE AND ACETAMINOPHEN 1 TABLET: 10; 325 TABLET ORAL at 02:29

## 2017-11-29 RX ADMIN — LEVETIRACETAM 1000 MG: 500 TABLET, FILM COATED ORAL at 09:18

## 2017-11-30 VITALS
HEART RATE: 92 BPM | TEMPERATURE: 98 F | SYSTOLIC BLOOD PRESSURE: 138 MMHG | DIASTOLIC BLOOD PRESSURE: 77 MMHG | RESPIRATION RATE: 18 BRPM | OXYGEN SATURATION: 97 %

## 2017-11-30 PROBLEM — O99.213 OBESITY AFFECTING PREGNANCY IN THIRD TRIMESTER: Status: ACTIVE | Noted: 2017-11-30

## 2017-11-30 RX ORDER — IBUPROFEN 600 MG/1
600 TABLET ORAL EVERY 6 HOURS PRN
Qty: 60 TABLET | Refills: 0 | Status: SHIPPED | OUTPATIENT
Start: 2017-11-30 | End: 2017-12-15 | Stop reason: SDUPTHER

## 2017-11-30 RX ORDER — OXYCODONE HYDROCHLORIDE AND ACETAMINOPHEN 5; 325 MG/1; MG/1
TABLET ORAL
Qty: 45 TABLET | Refills: 0 | Status: SHIPPED | OUTPATIENT
Start: 2017-11-30 | End: 2017-11-30

## 2017-11-30 RX ORDER — OXYCODONE HYDROCHLORIDE AND ACETAMINOPHEN 5; 325 MG/1; MG/1
TABLET ORAL
Qty: 45 TABLET | Refills: 0 | Status: SHIPPED | OUTPATIENT
Start: 2017-11-30 | End: 2018-02-12

## 2017-11-30 RX ADMIN — BISACODYL 10 MG: 10 SUPPOSITORY RECTAL at 09:09

## 2017-11-30 RX ADMIN — LEVETIRACETAM 1000 MG: 500 TABLET, FILM COATED ORAL at 09:09

## 2017-11-30 RX ADMIN — OXYCODONE HYDROCHLORIDE AND ACETAMINOPHEN 1 TABLET: 10; 325 TABLET ORAL at 13:04

## 2017-11-30 RX ADMIN — PRENATAL VIT W/ FE FUMARATE-FA TAB 27-0.8 MG 1 TABLET: 27-0.8 TAB at 09:09

## 2017-11-30 RX ADMIN — DOCUSATE SODIUM 100 MG: 100 CAPSULE, LIQUID FILLED ORAL at 09:09

## 2017-11-30 RX ADMIN — MAGNESIUM HYDROXIDE 10 ML: 2400 SUSPENSION ORAL at 09:09

## 2017-12-15 ENCOUNTER — OFFICE VISIT (OUTPATIENT)
Dept: OBSTETRICS AND GYNECOLOGY | Facility: CLINIC | Age: 32
End: 2017-12-15

## 2017-12-15 VITALS — SYSTOLIC BLOOD PRESSURE: 104 MMHG | BODY MASS INDEX: 43.03 KG/M2 | DIASTOLIC BLOOD PRESSURE: 54 MMHG | WEIGHT: 283 LBS

## 2017-12-15 PROCEDURE — 99024 POSTOP FOLLOW-UP VISIT: CPT | Performed by: OBSTETRICS & GYNECOLOGY

## 2017-12-15 RX ORDER — IBUPROFEN 600 MG/1
600 TABLET ORAL EVERY 6 HOURS PRN
Qty: 60 TABLET | Refills: 0 | Status: SHIPPED | OUTPATIENT
Start: 2017-12-15 | End: 2018-02-12

## 2017-12-15 RX ORDER — ACETAMINOPHEN 500 MG
1000 TABLET ORAL EVERY 8 HOURS PRN
Qty: 100 TABLET | Refills: 2 | Status: SHIPPED | OUTPATIENT
Start: 2017-12-15 | End: 2018-02-12

## 2017-12-15 NOTE — PROGRESS NOTES
Subjective   Chief Complaint   Patient presents with   • Gynecologic Exam      Tamanna Luna is a 32 y.o. year old  presenting to be seen for her postpartum visit.  She had a PLTCS 2/2 NRFHT on .  She states she is doing well, still sore but pain controlled with motrin.  Lochia more like spotting.  No issues with urination or bowel movements.  Denies feelings more tearful or sad than usual.  Declines PPFP.      Since delivery she has not been sexually active.  She does not have concerns about post-partum blues/depression.   She is bottle feeding.    The following portions of the patient's history were reviewed and updated as appropriate:current medications and allergies    Smoking status: Former Smoker                                                              Packs/day: 0.00      Years: 0.00      Smokeless status: Current User                    Comment: Patient states she quit about a year ago    Review of Systems - comprehensive ROS preformed and all negative.      Objective   /54  Wt 128 kg (283 lb)  LMP 2017 (Exact Date)  BMI 43.03 kg/m2    General:  well developed; well nourished  no acute distress   Abdomen: soft, non-tender; no masses  no umbilical or inginual hernias are present  no hepato-splenomegaly  incision is healing, clean, dry and intact   Pelvis: Not performed.          Assessment   1. Normal 2 week postpartum exam       Plan   1. Normal 2 week PP visit  - Continue routine post op and postpartum restrictions  - Incision healing well, continue routine wound care  - No concerns for PP Blues, signs and symptoms of PP Depression reviewed  - Declined PPFP at this time.   - RTC in 4 weeks for full PP visit     This note was electronically signed.    Christina Wisdom MD  December 15, 2017

## 2018-02-12 ENCOUNTER — OFFICE VISIT (OUTPATIENT)
Dept: OBSTETRICS AND GYNECOLOGY | Facility: CLINIC | Age: 33
End: 2018-02-12

## 2018-02-12 VITALS
BODY MASS INDEX: 44.41 KG/M2 | WEIGHT: 293 LBS | DIASTOLIC BLOOD PRESSURE: 66 MMHG | SYSTOLIC BLOOD PRESSURE: 102 MMHG | HEIGHT: 68 IN

## 2018-02-12 PROCEDURE — 99212 OFFICE O/P EST SF 10 MIN: CPT | Performed by: OBSTETRICS & GYNECOLOGY

## 2018-02-12 NOTE — PROGRESS NOTES
"Subjective   Chief Complaint   Patient presents with   • Post-op      Tamanna Luna is a 32 y.o. year old  presenting to be seen for her postpartum visit.  She had a PLTCS on 17.  She states she has been doing well.  Had a seizure and wasn't able to make it to her 6 weeks PP visit.  No issues with urination or bowel movements.  Lochia stopped, had has 2 periods since.  States periods have been slightly off, but wants to give it more time.  Baby doing well, bottle feeding.  Denies feel more sad or tearful than usual but does feel more tired than before pregnancy.     Since delivery she has not been sexually active.  She does not have concerns about post-partum blues/depression.   She is bottle feeding.    The following portions of the patient's history were reviewed and updated as appropriate:current medications and allergies    Smoking status: Current Every Day Smoker                                                   Packs/day: 0.00      Years: 0.00      Smokeless status: Current User                    Comment: Patient states she quit about a year ago    Review of Systems - comprehensive ROS preformed and all negative.       Objective   /66  Ht 172.7 cm (68\")  Wt 134 kg (295 lb)  Breastfeeding? No  BMI 44.85 kg/m2    General:  well developed; well nourished  no acute distress   Abdomen: soft, non-tender; no masses  no umbilical or inginual hernias are present  no hepato-splenomegaly  incision is healed   Pelvis: Not performed.          Assessment   1. Normal 6 week postpartum exam       Plan   1. Normal 6 week PP visit  - All post op and postpartum restrictions lifted  - Incision healed  - No concerns for PP depression   - Encouraged to follow up with neurologist  - RTC if desires something to help regulate cycles if irregularity continues or in 1 year for annual exam.      This note was electronically signed.    Christina Wisdom MD  2018  "

## (undated) DEVICE — GARMENT,MEDLINE,DVT,INT,CALF,MED, GEN2: Brand: MEDLINE

## (undated) DEVICE — PK C/SECT 60

## (undated) DEVICE — INTENDED FOR TISSUE SEPARATION, AND OTHER PROCEDURES THAT REQUIRE A SHARP SURGICAL BLADE TO PUNCTURE OR CUT.: Brand: BARD-PARKER ® STAINLESS STEEL BLADES

## (undated) DEVICE — SUT VIC 0 CT1 36IN J946H

## (undated) DEVICE — GLV SURG SENSICARE GREEN W/ALOE PF LF 6.5 STRL

## (undated) DEVICE — SUT VIC 0 CT 36IN J958H

## (undated) DEVICE — SUT  GUT PLAIN 2/0 CT1 27IN 843H

## (undated) DEVICE — SYS SKIN CLS DERMABOND PRINEO W/22CM MESH TP

## (undated) DEVICE — TRY SPINE PENCAN 24GA X4IN

## (undated) DEVICE — STERILE POLYISOPRENE POWDER-FREE SURGICAL GLOVES WITH EMOLLIENT COATING: Brand: PROTEXIS

## (undated) DEVICE — SUT MNCRYL 3/0 Y936H